# Patient Record
Sex: FEMALE | Race: WHITE | NOT HISPANIC OR LATINO | URBAN - METROPOLITAN AREA
[De-identification: names, ages, dates, MRNs, and addresses within clinical notes are randomized per-mention and may not be internally consistent; named-entity substitution may affect disease eponyms.]

---

## 2017-11-15 ENCOUNTER — OUTPATIENT (OUTPATIENT)
Dept: OUTPATIENT SERVICES | Facility: HOSPITAL | Age: 62
LOS: 1 days | Discharge: HOME | End: 2017-11-15

## 2017-11-15 VITALS
OXYGEN SATURATION: 98 % | HEART RATE: 71 BPM | TEMPERATURE: 98 F | SYSTOLIC BLOOD PRESSURE: 103 MMHG | WEIGHT: 248.24 LBS | HEIGHT: 67 IN | DIASTOLIC BLOOD PRESSURE: 68 MMHG | RESPIRATION RATE: 17 BRPM

## 2017-11-15 VITALS — HEIGHT: 67 IN | WEIGHT: 248.24 LBS

## 2017-11-15 DIAGNOSIS — E03.9 HYPOTHYROIDISM, UNSPECIFIED: ICD-10-CM

## 2017-11-15 DIAGNOSIS — M75.121 COMPLETE ROTATOR CUFF TEAR OR RUPTURE OF RIGHT SHOULDER, NOT SPECIFIED AS TRAUMATIC: ICD-10-CM

## 2017-11-15 DIAGNOSIS — Z98.890 OTHER SPECIFIED POSTPROCEDURAL STATES: Chronic | ICD-10-CM

## 2017-11-15 DIAGNOSIS — Z01.818 ENCOUNTER FOR OTHER PREPROCEDURAL EXAMINATION: ICD-10-CM

## 2017-11-15 DIAGNOSIS — Z90.49 ACQUIRED ABSENCE OF OTHER SPECIFIED PARTS OF DIGESTIVE TRACT: Chronic | ICD-10-CM

## 2017-11-15 DIAGNOSIS — I10 ESSENTIAL (PRIMARY) HYPERTENSION: ICD-10-CM

## 2017-11-15 DIAGNOSIS — I82.592 CHRONIC EMBOLISM AND THROMBOSIS OF OTHER SPECIFIED DEEP VEIN OF LEFT LOWER EXTREMITY: ICD-10-CM

## 2017-11-15 LAB
ANION GAP SERPL CALC-SCNC: 9 MMOL/L — SIGNIFICANT CHANGE UP (ref 5–17)
APTT BLD: 30.1 SEC — SIGNIFICANT CHANGE UP (ref 27.5–37.4)
BASOPHILS # BLD AUTO: 0.1 K/UL — SIGNIFICANT CHANGE UP (ref 0–0.2)
BASOPHILS NFR BLD AUTO: 1.2 % — SIGNIFICANT CHANGE UP (ref 0–2)
BUN SERPL-MCNC: 13 MG/DL — SIGNIFICANT CHANGE UP (ref 7–23)
CALCIUM SERPL-MCNC: 8.6 MG/DL — SIGNIFICANT CHANGE UP (ref 8.5–10.1)
CHLORIDE SERPL-SCNC: 108 MMOL/L — SIGNIFICANT CHANGE UP (ref 96–108)
CO2 SERPL-SCNC: 25 MMOL/L — SIGNIFICANT CHANGE UP (ref 22–31)
CREAT SERPL-MCNC: 0.65 MG/DL — SIGNIFICANT CHANGE UP (ref 0.5–1.3)
EOSINOPHIL # BLD AUTO: 0.4 K/UL — SIGNIFICANT CHANGE UP (ref 0–0.5)
EOSINOPHIL NFR BLD AUTO: 5.8 % — SIGNIFICANT CHANGE UP (ref 0–6)
GLUCOSE SERPL-MCNC: 84 MG/DL — SIGNIFICANT CHANGE UP (ref 70–99)
HCT VFR BLD CALC: 40.8 % — SIGNIFICANT CHANGE UP (ref 34.5–45)
HGB BLD-MCNC: 13.6 G/DL — SIGNIFICANT CHANGE UP (ref 11.5–15.5)
INR BLD: 1.04 RATIO — SIGNIFICANT CHANGE UP (ref 0.88–1.16)
LYMPHOCYTES # BLD AUTO: 2.7 K/UL — SIGNIFICANT CHANGE UP (ref 1–3.3)
LYMPHOCYTES # BLD AUTO: 42.6 % — SIGNIFICANT CHANGE UP (ref 13–44)
MCHC RBC-ENTMCNC: 30.2 PG — SIGNIFICANT CHANGE UP (ref 27–34)
MCHC RBC-ENTMCNC: 33.2 GM/DL — SIGNIFICANT CHANGE UP (ref 32–36)
MCV RBC AUTO: 91.1 FL — SIGNIFICANT CHANGE UP (ref 80–100)
MONOCYTES # BLD AUTO: 0.4 K/UL — SIGNIFICANT CHANGE UP (ref 0–0.9)
MONOCYTES NFR BLD AUTO: 7 % — SIGNIFICANT CHANGE UP (ref 2–14)
NEUTROPHILS # BLD AUTO: 2.7 K/UL — SIGNIFICANT CHANGE UP (ref 1.8–7.4)
NEUTROPHILS NFR BLD AUTO: 43.4 % — SIGNIFICANT CHANGE UP (ref 43–77)
PLATELET # BLD AUTO: 253 K/UL — SIGNIFICANT CHANGE UP (ref 150–400)
POTASSIUM SERPL-MCNC: 4.1 MMOL/L — SIGNIFICANT CHANGE UP (ref 3.5–5.3)
POTASSIUM SERPL-SCNC: 4.1 MMOL/L — SIGNIFICANT CHANGE UP (ref 3.5–5.3)
PROTHROM AB SERPL-ACNC: 11.3 SEC — SIGNIFICANT CHANGE UP (ref 9.8–12.7)
RBC # BLD: 4.49 M/UL — SIGNIFICANT CHANGE UP (ref 3.8–5.2)
RBC # FLD: 11.3 % — SIGNIFICANT CHANGE UP (ref 11–15)
SODIUM SERPL-SCNC: 142 MMOL/L — SIGNIFICANT CHANGE UP (ref 135–145)
WBC # BLD: 6.3 K/UL — SIGNIFICANT CHANGE UP (ref 3.8–10.5)
WBC # FLD AUTO: 6.3 K/UL — SIGNIFICANT CHANGE UP (ref 3.8–10.5)

## 2017-11-15 RX ORDER — RIVAROXABAN 15 MG-20MG
1 KIT ORAL
Qty: 0 | Refills: 0 | COMMUNITY
End: 2017-11-08

## 2017-11-15 NOTE — H&P PST ADULT - FAMILY HISTORY
Father  Still living? No  Family history of heart disease, Age at diagnosis: Age Unknown     Mother  Still living? Unknown  Family history of gastric ulcer, Age at diagnosis: Age Unknown  Family history of hypertension, Age at diagnosis: Age Unknown

## 2017-11-15 NOTE — H&P PST ADULT - HISTORY OF PRESENT ILLNESS
Patient reports "I tore my right rotator cuff".  Noticed pain about 8 months ago which prompter her to see MD.  Tried one shot of cortisone back in February which gave her pain relief; pain returned and she states the MD told her it was too early for another shot she needs an MRI.  MRI confirmed Right rotator cuff tear.  Reports numbness and tingling down down the right arm.  Take Motrin occasionally for pain along with warm compress and ice packs.

## 2017-11-15 NOTE — H&P PST ADULT - NSANTHOSAYNRD_GEN_A_CORE
No. RYAN screening performed.  STOP BANG Legend: 0-2 = LOW Risk; 3-4 = INTERMEDIATE Risk; 5-8 = HIGH Risk

## 2017-11-15 NOTE — ASU PATIENT PROFILE, ADULT - PMH
Acquired hypothyroidism    Chronic deep vein thrombosis (DVT) of other vein of left lower extremity    Essential hypertension

## 2017-11-15 NOTE — H&P PST ADULT - PMH
Acquired hypothyroidism    Chronic deep vein thrombosis (DVT) of other vein of left lower extremity    Essential hypertension    Obese

## 2017-11-15 NOTE — H&P PST ADULT - PROBLEM SELECTOR PLAN 1
continue current management  report s/s of recurrent DVT to Vascular MD  ambulate as tolerated  smoking cessation

## 2017-11-27 ENCOUNTER — TRANSCRIPTION ENCOUNTER (OUTPATIENT)
Age: 62
End: 2017-11-27

## 2017-11-27 ENCOUNTER — OUTPATIENT (OUTPATIENT)
Dept: OUTPATIENT SERVICES | Facility: HOSPITAL | Age: 62
LOS: 1 days | Discharge: HOME | End: 2017-11-27

## 2017-11-27 VITALS
RESPIRATION RATE: 17 BRPM | OXYGEN SATURATION: 99 % | TEMPERATURE: 98 F | HEART RATE: 65 BPM | SYSTOLIC BLOOD PRESSURE: 107 MMHG | DIASTOLIC BLOOD PRESSURE: 51 MMHG

## 2017-11-27 VITALS
SYSTOLIC BLOOD PRESSURE: 109 MMHG | OXYGEN SATURATION: 98 % | WEIGHT: 240.08 LBS | HEART RATE: 68 BPM | DIASTOLIC BLOOD PRESSURE: 54 MMHG | TEMPERATURE: 97 F | RESPIRATION RATE: 22 BRPM | HEIGHT: 68 IN

## 2017-11-27 DIAGNOSIS — Z98.890 OTHER SPECIFIED POSTPROCEDURAL STATES: Chronic | ICD-10-CM

## 2017-11-27 DIAGNOSIS — Z90.49 ACQUIRED ABSENCE OF OTHER SPECIFIED PARTS OF DIGESTIVE TRACT: Chronic | ICD-10-CM

## 2017-11-27 RX ORDER — IBUPROFEN 200 MG
1 TABLET ORAL
Qty: 0 | Refills: 0 | COMMUNITY

## 2017-11-27 RX ORDER — SODIUM CHLORIDE 9 MG/ML
3 INJECTION INTRAMUSCULAR; INTRAVENOUS; SUBCUTANEOUS EVERY 8 HOURS
Qty: 0 | Refills: 0 | Status: DISCONTINUED | OUTPATIENT
Start: 2017-11-27 | End: 2017-11-27

## 2017-11-27 RX ORDER — DOCUSATE SODIUM 100 MG
1 CAPSULE ORAL
Qty: 30 | Refills: 0
Start: 2017-11-27

## 2017-11-27 RX ORDER — SODIUM CHLORIDE 9 MG/ML
1000 INJECTION, SOLUTION INTRAVENOUS
Qty: 0 | Refills: 0 | Status: DISCONTINUED | OUTPATIENT
Start: 2017-11-27 | End: 2017-12-12

## 2017-11-27 RX ORDER — OXYCODONE HYDROCHLORIDE 5 MG/1
1 TABLET ORAL
Qty: 60 | Refills: 0
Start: 2017-11-27

## 2017-11-27 RX ADMIN — SODIUM CHLORIDE 75 MILLILITER(S): 9 INJECTION, SOLUTION INTRAVENOUS at 14:57

## 2017-11-27 RX ADMIN — SODIUM CHLORIDE 3 MILLILITER(S): 9 INJECTION INTRAMUSCULAR; INTRAVENOUS; SUBCUTANEOUS at 10:26

## 2017-11-27 NOTE — BRIEF OPERATIVE NOTE - PROCEDURE
<<-----Click on this checkbox to enter Procedure Rotator cuff repair  11/27/2017  S/P right shoulder arthropscopic rotator cuff repair, biceps tenotomy, subacromial decmpression  Active  RICHARDICHEL

## 2017-11-27 NOTE — ASU DISCHARGE PLAN (ADULT/PEDIATRIC). - MEDICATION SUMMARY - MEDICATIONS TO TAKE
I will START or STAY ON the medications listed below when I get home from the hospital:    Edarbi  -- 20 milligram(s) by mouth once a day  -- Indication: For home med    Lasix  -- 10 milligram(s) by mouth once a day  -- Indication: For home med    potassium chloride 20 mEq oral tablet, extended release  -- orally once a day  -- Indication: For home med    Synthroid 137 mcg (0.137 mg) oral tablet  -- 1 tab(s) by mouth once a day  -- Indication: For home med    Calcium 600+D oral tablet  -- 1 tab(s) by mouth once a day  -- Indication: For home med I will START or STAY ON the medications listed below when I get home from the hospital:    oxyCODONE 5 mg oral capsule  -- 1 cap(s) by mouth every 4-6 hours, As Needed -for severe pain MDD:6  -- Caution federal law prohibits the transfer of this drug to any person other  than the person for whom it was prescribed.  It is very important that you take or use this exactly as directed.  Do not skip doses or discontinue unless directed by your doctor.  May cause drowsiness.  Alcohol may intensify this effect.  Use care when operating dangerous machinery.  This prescription cannot be refilled.  Using more of this medication than prescribed may cause serious breathing problems.    -- Indication: For RIGHT SHOULDER ARTHROSCOPY ACROMIOPLASTY SUBACROMIAL DECOMPR    Edarbi  -- 20 milligram(s) by mouth once a day  -- Indication: For home med    Lasix  -- 10 milligram(s) by mouth once a day  -- Indication: For home med    Colace 100 mg oral capsule  -- 1 cap(s) by mouth 2 times a day MDD:2  -- Medication should be taken with plenty of water.    -- Indication: For constipation     potassium chloride 20 mEq oral tablet, extended release  -- orally once a day  -- Indication: For home med    Synthroid 137 mcg (0.137 mg) oral tablet  -- 1 tab(s) by mouth once a day  -- Indication: For home med    Calcium 600+D oral tablet  -- 1 tab(s) by mouth once a day  -- Indication: For home med

## 2017-11-27 NOTE — BRIEF OPERATIVE NOTE - OPERATION/FINDINGS
S/P right shoulder arthropscopic rotator cuff repair, biceps tenotomy, subacromial decmpression    See op reprt

## 2017-11-27 NOTE — ASU PATIENT PROFILE, ADULT - VISION (WITH CORRECTIVE LENSES IF THE PATIENT USUALLY WEARS THEM):
Wear Glasses/Normal vision: sees adequately in most situations; can see medication labels, newsprint

## 2017-11-29 DIAGNOSIS — Z72.0 TOBACCO USE: ICD-10-CM

## 2017-11-29 DIAGNOSIS — Z79.1 LONG TERM (CURRENT) USE OF NON-STEROIDAL ANTI-INFLAMMATORIES (NSAID): ICD-10-CM

## 2017-11-29 DIAGNOSIS — M75.121 COMPLETE ROTATOR CUFF TEAR OR RUPTURE OF RIGHT SHOULDER, NOT SPECIFIED AS TRAUMATIC: ICD-10-CM

## 2017-11-29 DIAGNOSIS — Z88.0 ALLERGY STATUS TO PENICILLIN: ICD-10-CM

## 2017-11-29 DIAGNOSIS — Z91.040 LATEX ALLERGY STATUS: ICD-10-CM

## 2017-11-29 DIAGNOSIS — M66.821 SPONTANEOUS RUPTURE OF OTHER TENDONS, RIGHT UPPER ARM: ICD-10-CM

## 2017-11-29 DIAGNOSIS — E03.9 HYPOTHYROIDISM, UNSPECIFIED: ICD-10-CM

## 2017-11-29 DIAGNOSIS — I10 ESSENTIAL (PRIMARY) HYPERTENSION: ICD-10-CM

## 2017-11-29 DIAGNOSIS — M25.511 PAIN IN RIGHT SHOULDER: ICD-10-CM

## 2019-07-10 PROBLEM — E66.9 OBESITY, UNSPECIFIED: Chronic | Status: ACTIVE | Noted: 2017-11-15

## 2019-07-10 PROBLEM — E03.9 HYPOTHYROIDISM, UNSPECIFIED: Chronic | Status: ACTIVE | Noted: 2017-11-15

## 2019-07-10 PROBLEM — I82.592 CHRONIC EMBOLISM AND THROMBOSIS OF OTHER SPECIFIED DEEP VEIN OF LEFT LOWER EXTREMITY: Chronic | Status: ACTIVE | Noted: 2017-11-15

## 2019-07-16 ENCOUNTER — OUTPATIENT (OUTPATIENT)
Dept: OUTPATIENT SERVICES | Facility: HOSPITAL | Age: 64
LOS: 1 days | Discharge: HOME | End: 2019-07-16
Payer: COMMERCIAL

## 2019-07-16 VITALS
HEART RATE: 76 BPM | RESPIRATION RATE: 18 BRPM | WEIGHT: 231.71 LBS | TEMPERATURE: 98 F | OXYGEN SATURATION: 97 % | DIASTOLIC BLOOD PRESSURE: 73 MMHG | HEIGHT: 67 IN | SYSTOLIC BLOOD PRESSURE: 130 MMHG

## 2019-07-16 DIAGNOSIS — M75.122 COMPLETE ROTATOR CUFF TEAR OR RUPTURE OF LEFT SHOULDER, NOT SPECIFIED AS TRAUMATIC: ICD-10-CM

## 2019-07-16 DIAGNOSIS — Z98.890 OTHER SPECIFIED POSTPROCEDURAL STATES: Chronic | ICD-10-CM

## 2019-07-16 DIAGNOSIS — E03.9 HYPOTHYROIDISM, UNSPECIFIED: ICD-10-CM

## 2019-07-16 DIAGNOSIS — Z01.812 ENCOUNTER FOR PREPROCEDURAL LABORATORY EXAMINATION: ICD-10-CM

## 2019-07-16 DIAGNOSIS — Z90.49 ACQUIRED ABSENCE OF OTHER SPECIFIED PARTS OF DIGESTIVE TRACT: Chronic | ICD-10-CM

## 2019-07-16 DIAGNOSIS — Z01.818 ENCOUNTER FOR OTHER PREPROCEDURAL EXAMINATION: ICD-10-CM

## 2019-07-16 DIAGNOSIS — I82.819 EMBOLISM AND THROMBOSIS OF SUPERFICIAL VEINS OF UNSPECIFIED LOWER EXTREMITY: ICD-10-CM

## 2019-07-16 LAB
ANION GAP SERPL CALC-SCNC: 7 MMOL/L — SIGNIFICANT CHANGE UP (ref 5–17)
BASOPHILS # BLD AUTO: 0.04 K/UL — SIGNIFICANT CHANGE UP (ref 0–0.2)
BASOPHILS NFR BLD AUTO: 0.6 % — SIGNIFICANT CHANGE UP (ref 0–2)
BUN SERPL-MCNC: 10 MG/DL — SIGNIFICANT CHANGE UP (ref 7–23)
CALCIUM SERPL-MCNC: 9 MG/DL — SIGNIFICANT CHANGE UP (ref 8.5–10.1)
CHLORIDE SERPL-SCNC: 106 MMOL/L — SIGNIFICANT CHANGE UP (ref 96–108)
CO2 SERPL-SCNC: 27 MMOL/L — SIGNIFICANT CHANGE UP (ref 22–31)
CREAT SERPL-MCNC: 0.7 MG/DL — SIGNIFICANT CHANGE UP (ref 0.5–1.3)
EOSINOPHIL # BLD AUTO: 0.21 K/UL — SIGNIFICANT CHANGE UP (ref 0–0.5)
EOSINOPHIL NFR BLD AUTO: 3 % — SIGNIFICANT CHANGE UP (ref 0–6)
GLUCOSE SERPL-MCNC: 86 MG/DL — SIGNIFICANT CHANGE UP (ref 70–99)
HCT VFR BLD CALC: 40.2 % — SIGNIFICANT CHANGE UP (ref 34.5–45)
HGB BLD-MCNC: 13.4 G/DL — SIGNIFICANT CHANGE UP (ref 11.5–15.5)
IMM GRANULOCYTES NFR BLD AUTO: 0.1 % — SIGNIFICANT CHANGE UP (ref 0–1.5)
LYMPHOCYTES # BLD AUTO: 2.7 K/UL — SIGNIFICANT CHANGE UP (ref 1–3.3)
LYMPHOCYTES # BLD AUTO: 38.2 % — SIGNIFICANT CHANGE UP (ref 13–44)
MCHC RBC-ENTMCNC: 29.7 PG — SIGNIFICANT CHANGE UP (ref 27–34)
MCHC RBC-ENTMCNC: 33.3 GM/DL — SIGNIFICANT CHANGE UP (ref 32–36)
MCV RBC AUTO: 89.1 FL — SIGNIFICANT CHANGE UP (ref 80–100)
MONOCYTES # BLD AUTO: 0.47 K/UL — SIGNIFICANT CHANGE UP (ref 0–0.9)
MONOCYTES NFR BLD AUTO: 6.7 % — SIGNIFICANT CHANGE UP (ref 2–14)
NEUTROPHILS # BLD AUTO: 3.63 K/UL — SIGNIFICANT CHANGE UP (ref 1.8–7.4)
NEUTROPHILS NFR BLD AUTO: 51.4 % — SIGNIFICANT CHANGE UP (ref 43–77)
NRBC # BLD: 0 /100 WBCS — SIGNIFICANT CHANGE UP (ref 0–0)
PLATELET # BLD AUTO: 299 K/UL — SIGNIFICANT CHANGE UP (ref 150–400)
POTASSIUM SERPL-MCNC: 4 MMOL/L — SIGNIFICANT CHANGE UP (ref 3.5–5.3)
POTASSIUM SERPL-SCNC: 4 MMOL/L — SIGNIFICANT CHANGE UP (ref 3.5–5.3)
RBC # BLD: 4.51 M/UL — SIGNIFICANT CHANGE UP (ref 3.8–5.2)
RBC # FLD: 13.3 % — SIGNIFICANT CHANGE UP (ref 10.3–14.5)
SODIUM SERPL-SCNC: 140 MMOL/L — SIGNIFICANT CHANGE UP (ref 135–145)
WBC # BLD: 7.06 K/UL — SIGNIFICANT CHANGE UP (ref 3.8–10.5)
WBC # FLD AUTO: 7.06 K/UL — SIGNIFICANT CHANGE UP (ref 3.8–10.5)

## 2019-07-16 PROCEDURE — 93010 ELECTROCARDIOGRAM REPORT: CPT

## 2019-07-16 RX ORDER — AZILSARTAN KAMEDOXOMIL 40 MG/1
20 TABLET ORAL
Qty: 0 | Refills: 0 | DISCHARGE

## 2019-07-16 NOTE — H&P PST ADULT - NSICDXPASTMEDICALHX_GEN_ALL_CORE_FT
PAST MEDICAL HISTORY:  Acquired hypothyroidism     Chronic deep vein thrombosis (DVT) of other vein of left lower extremity     Essential hypertension     Obese

## 2019-07-16 NOTE — H&P PST ADULT - NSICDXPROBLEM_GEN_ALL_CORE_FT
PROBLEM DIAGNOSES  Problem: Complete rotator cuff tear or rupture of left shoulder, not specified as traumatic  Assessment and Plan: Left shoulder arthroscopy  Pre-op instructions given, patient verbalized understanding  Chlorhexidine wash instructions given   Pending: medical clearance  Pending: vascular clearance    Problem: Hypothyroid  Assessment and Plan: Continue current regimen and medications.     Problem: Venous embolism and thrombosis of superficial vessels of lower extremity, unspecified laterality  Assessment and Plan: Being followed by vascular  Pending: Vascular clearance

## 2019-07-16 NOTE — H&P PST ADULT - HISTORY OF PRESENT ILLNESS
63F pmh superficial venous thrombosis, hypothyroid c/o left shoulder pain found to have complete rotator cuff tear here for PST for scheduled left shoulder arthroscopy

## 2019-07-16 NOTE — H&P PST ADULT - ASSESSMENT
63F pmh superficial venous thrombosis, hypothyroid c/o left shoulder pain found to have complete rotator cuff tear here for PST for scheduled left shoulder arthroscopy  CAPRINI SCORE    AGE RELATED RISK FACTORS                                                       MOBILITY RELATED FACTORS  [ ] Age 41-60 years                                            (1 Point)                  [ ] Bed rest                                                        (1 Point)  [x] Age: 61-74 years                                           (2 Points)                [ ] Plaster cast                                                   (2 Points)  [ ] Age= 75 years                                              (3 Points)                 [ ] Bed bound for more than 72 hours                   (2 Points)    DISEASE RELATED RISK FACTORS                                               GENDER SPECIFIC FACTORS  [x ] Edema in the lower extremities                       (1 Point)                  [ ] Pregnancy                                                     (1 Point)  [ ] Varicose veins                                               (1 Point)                  [ ] Post-partum < 6 weeks                                   (1 Point)             [x ] BMI > 25 Kg/m2                                            (1 Point)                  [ ] Hormonal therapy  or oral contraception            (1 Point)                 [ ] Sepsis (in the previous month)                        (1 Point)                  [ ] History of pregnancy complications  [ ] Pneumonia or serious lung disease                                               [ ] Unexplained or recurrent                       (1 Point)           (in the previous month)                               (1 Point)  [ ] Abnormal pulmonary function test                     (1 Point)                 SURGERY RELATED RISK FACTORS  [ ] Acute myocardial infarction                              (1 Point)                 [ ]  Section                                            (1 Point)  [ ] Congestive heart failure (in the previous month)  (1 Point)                 [ ] Minor surgery                                                 (1 Point)   [ ] Inflammatory bowel disease                             (1 Point)                 [x ] Arthroscopic surgery                                        (2 Points)  [ ] Central venous access                                    (2 Points)                [ ] General surgery lasting more than 45 minutes   (2 Points)       [ ] Stroke (in the previous month)                          (5 Points)               [ ] Elective arthroplasty                                        (5 Points)                                                                                                                                               HEMATOLOGY RELATED FACTORS                                                 TRAUMA RELATED RISK FACTORS  [x ] Prior episodes of VTE                                     (3 Points)                 [ ] Fracture of the hip, pelvis, or leg                       (5 Points)  [ ] Positive family history for VTE                         (3 Points)                 [ ] Acute spinal cord injury (in the previous month)  (5 Points)  [ ] Prothrombin 77308 A                                      (3 Points)                 [ ] Paralysis  (less than 1 month)                          (5 Points)  [ ] Factor V Leiden                                             (3 Points)                 [ ] Multiple Trauma within 1 month                         (5 Points)  [ ] Lupus anticoagulants                                     (3 Points)                                                           [ ] Anticardiolipin antibodies                                (3 Points)                                                       [ ] High homocysteine in the blood                      (3 Points)                                             [ ] Other congenital or acquired thrombophilia       (3 Points)                                                [ ] Heparin induced thrombocytopenia                  (3 Points)                                          Total Score [     9     ]

## 2019-07-16 NOTE — H&P PST ADULT - NSICDXPASTSURGICALHX_GEN_ALL_CORE_FT
PAST SURGICAL HISTORY:  H/O arthroscopy right shoulder    H/O vein stripping     History of cholecystectomy     S/P cervical disc replacement 2013

## 2019-07-16 NOTE — H&P PST ADULT - NSICDXFAMILYHX_GEN_ALL_CORE_FT
FAMILY HISTORY:  Father  Still living? No  Family history of heart disease, Age at diagnosis: Age Unknown    Mother  Still living? Unknown  Family history of gastric ulcer, Age at diagnosis: Age Unknown  Family history of hypertension, Age at diagnosis: Age Unknown

## 2019-07-21 ENCOUNTER — TRANSCRIPTION ENCOUNTER (OUTPATIENT)
Age: 64
End: 2019-07-21

## 2019-07-22 ENCOUNTER — OUTPATIENT (OUTPATIENT)
Dept: OUTPATIENT SERVICES | Facility: HOSPITAL | Age: 64
LOS: 1 days | Discharge: HOME | End: 2019-07-22

## 2019-07-22 VITALS
RESPIRATION RATE: 18 BRPM | HEART RATE: 80 BPM | HEIGHT: 67 IN | DIASTOLIC BLOOD PRESSURE: 61 MMHG | SYSTOLIC BLOOD PRESSURE: 113 MMHG | WEIGHT: 231.71 LBS | TEMPERATURE: 97 F | OXYGEN SATURATION: 98 %

## 2019-07-22 VITALS
OXYGEN SATURATION: 98 % | HEART RATE: 73 BPM | RESPIRATION RATE: 17 BRPM | DIASTOLIC BLOOD PRESSURE: 75 MMHG | SYSTOLIC BLOOD PRESSURE: 139 MMHG

## 2019-07-22 DIAGNOSIS — Z90.49 ACQUIRED ABSENCE OF OTHER SPECIFIED PARTS OF DIGESTIVE TRACT: Chronic | ICD-10-CM

## 2019-07-22 DIAGNOSIS — Z98.890 OTHER SPECIFIED POSTPROCEDURAL STATES: Chronic | ICD-10-CM

## 2019-07-22 RX ORDER — SODIUM CHLORIDE 9 MG/ML
1000 INJECTION, SOLUTION INTRAVENOUS
Refills: 0 | Status: DISCONTINUED | OUTPATIENT
Start: 2019-07-22 | End: 2019-07-22

## 2019-07-22 RX ORDER — ACETAMINOPHEN 500 MG
1000 TABLET ORAL ONCE
Refills: 0 | Status: COMPLETED | OUTPATIENT
Start: 2019-07-22 | End: 2019-07-22

## 2019-07-22 RX ADMIN — SODIUM CHLORIDE 75 MILLILITER(S): 9 INJECTION, SOLUTION INTRAVENOUS at 11:12

## 2019-07-22 RX ADMIN — Medication 400 MILLIGRAM(S): at 11:37

## 2019-07-22 NOTE — ASU PATIENT PROFILE, ADULT - PSH
H/O arthroscopy  right shoulder  H/O vein stripping  25 years ago  History of cholecystectomy    S/P cervical disc replacement  2013  S/P rotator cuff repair  right shoulder Nov 2017

## 2019-07-22 NOTE — ASU DISCHARGE PLAN (ADULT/PEDIATRIC) - PROCEDURE
S/P Left shoulder arthroscopic biceps tenotomy, rotator cuff debridement, subacromial decmpression S/P Left shoulder arthroscopic biceps tenotomy, rotator cuff debridement, subacromial deco```````````````````mpression

## 2019-07-22 NOTE — BRIEF OPERATIVE NOTE - OPERATION/FINDINGS
see op report    S/P Left shoulder arthroscopic glenohumeral debridement, biceps tenotomy, rotator cuff debridement, subacromial decompression

## 2019-07-24 DIAGNOSIS — M66.822 SPONTANEOUS RUPTURE OF OTHER TENDONS, LEFT UPPER ARM: ICD-10-CM

## 2019-07-24 DIAGNOSIS — M75.122 COMPLETE ROTATOR CUFF TEAR OR RUPTURE OF LEFT SHOULDER, NOT SPECIFIED AS TRAUMATIC: ICD-10-CM

## 2019-07-24 DIAGNOSIS — M24.112 OTHER ARTICULAR CARTILAGE DISORDERS, LEFT SHOULDER: ICD-10-CM

## 2019-07-24 DIAGNOSIS — E03.9 HYPOTHYROIDISM, UNSPECIFIED: ICD-10-CM

## 2019-07-24 DIAGNOSIS — M19.012 PRIMARY OSTEOARTHRITIS, LEFT SHOULDER: ICD-10-CM

## 2019-07-24 DIAGNOSIS — Z72.0 TOBACCO USE: ICD-10-CM

## 2019-07-24 DIAGNOSIS — Z88.0 ALLERGY STATUS TO PENICILLIN: ICD-10-CM

## 2019-07-24 DIAGNOSIS — Z86.718 PERSONAL HISTORY OF OTHER VENOUS THROMBOSIS AND EMBOLISM: ICD-10-CM

## 2019-07-24 DIAGNOSIS — M75.52 BURSITIS OF LEFT SHOULDER: ICD-10-CM

## 2019-07-24 DIAGNOSIS — Z98.1 ARTHRODESIS STATUS: ICD-10-CM

## 2019-07-24 DIAGNOSIS — I10 ESSENTIAL (PRIMARY) HYPERTENSION: ICD-10-CM

## 2019-07-24 DIAGNOSIS — Z91.040 LATEX ALLERGY STATUS: ICD-10-CM

## 2020-01-15 NOTE — H&P PST ADULT - VENOUS THROMBOEMBOLISM SCORE
Preoperative instructions reviewed with patient. Patient given  2 bottles of CHG soap. Instructions reviewed on use of CHG soap. Patient given SSI infection FAQS sheet, as well as a  MRSA/MSSA treatment instruction sheet  With an explanation to patient that they will be notified if treatment instructions need to be initiated. Patient was given the opportunity to ask questions on the information provided. 8

## 2020-07-20 ENCOUNTER — OUTPATIENT (OUTPATIENT)
Dept: OUTPATIENT SERVICES | Facility: HOSPITAL | Age: 65
LOS: 1 days | End: 2020-07-20
Payer: COMMERCIAL

## 2020-07-20 VITALS
HEIGHT: 66 IN | WEIGHT: 22.49 LBS | SYSTOLIC BLOOD PRESSURE: 138 MMHG | TEMPERATURE: 100 F | OXYGEN SATURATION: 97 % | RESPIRATION RATE: 18 BRPM | DIASTOLIC BLOOD PRESSURE: 70 MMHG | HEART RATE: 67 BPM

## 2020-07-20 DIAGNOSIS — Z90.49 ACQUIRED ABSENCE OF OTHER SPECIFIED PARTS OF DIGESTIVE TRACT: Chronic | ICD-10-CM

## 2020-07-20 DIAGNOSIS — Z98.890 OTHER SPECIFIED POSTPROCEDURAL STATES: Chronic | ICD-10-CM

## 2020-07-20 DIAGNOSIS — M24.574 CONTRACTURE, RIGHT FOOT: ICD-10-CM

## 2020-07-20 DIAGNOSIS — E03.9 HYPOTHYROIDISM, UNSPECIFIED: ICD-10-CM

## 2020-07-20 DIAGNOSIS — M20.5X1 OTHER DEFORMITIES OF TOE(S) (ACQUIRED), RIGHT FOOT: ICD-10-CM

## 2020-07-20 DIAGNOSIS — I10 ESSENTIAL (PRIMARY) HYPERTENSION: ICD-10-CM

## 2020-07-20 DIAGNOSIS — I87.2 VENOUS INSUFFICIENCY (CHRONIC) (PERIPHERAL): ICD-10-CM

## 2020-07-20 DIAGNOSIS — M20.41 OTHER HAMMER TOE(S) (ACQUIRED), RIGHT FOOT: ICD-10-CM

## 2020-07-20 DIAGNOSIS — Z91.040 LATEX ALLERGY STATUS: ICD-10-CM

## 2020-07-20 DIAGNOSIS — Z01.818 ENCOUNTER FOR OTHER PREPROCEDURAL EXAMINATION: ICD-10-CM

## 2020-07-20 DIAGNOSIS — F17.200 NICOTINE DEPENDENCE, UNSPECIFIED, UNCOMPLICATED: ICD-10-CM

## 2020-07-20 DIAGNOSIS — M20.11 HALLUX VALGUS (ACQUIRED), RIGHT FOOT: ICD-10-CM

## 2020-07-20 DIAGNOSIS — R60.0 LOCALIZED EDEMA: ICD-10-CM

## 2020-07-20 LAB
ANION GAP SERPL CALC-SCNC: 8 MMOL/L — SIGNIFICANT CHANGE UP (ref 5–17)
BUN SERPL-MCNC: 14 MG/DL — SIGNIFICANT CHANGE UP (ref 7–23)
CALCIUM SERPL-MCNC: 8.7 MG/DL — SIGNIFICANT CHANGE UP (ref 8.4–10.5)
CHLORIDE SERPL-SCNC: 107 MMOL/L — SIGNIFICANT CHANGE UP (ref 96–108)
CO2 SERPL-SCNC: 26 MMOL/L — SIGNIFICANT CHANGE UP (ref 22–31)
CREAT SERPL-MCNC: 0.7 MG/DL — SIGNIFICANT CHANGE UP (ref 0.5–1.3)
GLUCOSE SERPL-MCNC: 93 MG/DL — SIGNIFICANT CHANGE UP (ref 70–99)
HCT VFR BLD CALC: 40.7 % — SIGNIFICANT CHANGE UP (ref 34.5–45)
HGB BLD-MCNC: 13.7 G/DL — SIGNIFICANT CHANGE UP (ref 11.5–15.5)
MCHC RBC-ENTMCNC: 30 PG — SIGNIFICANT CHANGE UP (ref 27–34)
MCHC RBC-ENTMCNC: 33.7 GM/DL — SIGNIFICANT CHANGE UP (ref 32–36)
MCV RBC AUTO: 89.1 FL — SIGNIFICANT CHANGE UP (ref 80–100)
NRBC # BLD: 0 /100 WBCS — SIGNIFICANT CHANGE UP (ref 0–0)
PLATELET # BLD AUTO: 266 K/UL — SIGNIFICANT CHANGE UP (ref 150–400)
POTASSIUM SERPL-MCNC: 4.3 MMOL/L — SIGNIFICANT CHANGE UP (ref 3.5–5.3)
POTASSIUM SERPL-SCNC: 4.3 MMOL/L — SIGNIFICANT CHANGE UP (ref 3.5–5.3)
RBC # BLD: 4.57 M/UL — SIGNIFICANT CHANGE UP (ref 3.8–5.2)
RBC # FLD: 11.9 % — SIGNIFICANT CHANGE UP (ref 10.3–14.5)
SODIUM SERPL-SCNC: 141 MMOL/L — SIGNIFICANT CHANGE UP (ref 135–145)
WBC # BLD: 5.57 K/UL — SIGNIFICANT CHANGE UP (ref 3.8–10.5)
WBC # FLD AUTO: 5.57 K/UL — SIGNIFICANT CHANGE UP (ref 3.8–10.5)

## 2020-07-20 PROCEDURE — 85027 COMPLETE CBC AUTOMATED: CPT

## 2020-07-20 PROCEDURE — 93010 ELECTROCARDIOGRAM REPORT: CPT | Mod: NC

## 2020-07-20 PROCEDURE — 36415 COLL VENOUS BLD VENIPUNCTURE: CPT

## 2020-07-20 PROCEDURE — 80048 BASIC METABOLIC PNL TOTAL CA: CPT

## 2020-07-20 PROCEDURE — G0463: CPT

## 2020-07-20 PROCEDURE — 93005 ELECTROCARDIOGRAM TRACING: CPT

## 2020-07-20 RX ORDER — POTASSIUM CHLORIDE 20 MEQ
0 PACKET (EA) ORAL
Qty: 0 | Refills: 0 | DISCHARGE

## 2020-07-20 RX ORDER — FUROSEMIDE 40 MG
10 TABLET ORAL
Qty: 0 | Refills: 0 | DISCHARGE

## 2020-07-20 NOTE — H&P PST ADULT - NEGATIVE CARDIOVASCULAR SYMPTOMS
no claudication/no paroxysmal nocturnal dyspnea/no palpitations/no dyspnea on exertion/no orthopnea/no chest pain

## 2020-07-20 NOTE — H&P PST ADULT - NSICDXPASTMEDICALHX_GEN_ALL_CORE_FT
PAST MEDICAL HISTORY:  Acquired hypothyroidism     Chronic deep vein thrombosis (DVT) of other vein of left lower extremity     Essential hypertension     Latex allergy status     Obese     Smoker     Venous insufficiency

## 2020-07-20 NOTE — H&P PST ADULT - NEGATIVE ALLERGY TYPES
no outdoor environmental allergies/no indoor environmental allergies/no reactions to food/no reactions to animals

## 2020-07-20 NOTE — H&P PST ADULT - NSICDXPASTSURGICALHX_GEN_ALL_CORE_FT
PAST SURGICAL HISTORY:  H/O arthroscopy right shoulder    H/O vein stripping 25 years ago    History of cholecystectomy     S/P cervical disc replacement 2013    S/P rotator cuff repair right shoulder Nov 2017

## 2020-07-20 NOTE — H&P PST ADULT - HISTORY OF PRESENT ILLNESS
65yo female current everyday smoker with medical h/o Veinous insufficiency, Hypothyroid and Bunion, right foot. Pt presents today for PST for Correction of Hammertoe and Malik Josafat, Right Foot scheduled for 7/31/2020 63yo female current everyday smoker with medical h/o Venous insufficiency, Hypothyroid and Bunion, right foot. Pt presents today for PST for Correction of Hammertoe and Malik Josafat, Right Foot scheduled for 7/31/2020

## 2020-07-20 NOTE — H&P PST ADULT - NSICDXPROBLEM_GEN_ALL_CORE_FT
PROBLEM DIAGNOSES  Problem: Hallux valgus (acquired), right foot  Assessment and Plan: Pre-op instructions given. Pt verbalized udnerstanding  Chlorhexidine wash instructions given  Pendign: M/C & Vascular clearance + Covid19 test to be done 7/28    Problem: Other hammer toe(s) (acquired), right foot  Assessment and Plan: noted     Problem: Hypothyroidism  Assessment and Plan: Pt instrycted to take meds as prescribed     Problem: Hypertension  Assessment and Plan: RYAN precaution - stop bang 3  Pt instructed to take meds as prescribed     Problem: Pedal edema  Assessment and Plan: Pt instructed to take meds as prescribed     Problem: Venous insufficiency  Assessment and Plan: noted     Problem: Latex allergy status  Assessment and Plan: noted    Problem: Smoker  Assessment and Plan: Smoking cessation encouraged PROBLEM DIAGNOSES  Problem: Hallux valgus (acquired), right foot  Assessment and Plan: Pre-op instructions given. Pt verbalized understanding  Chlorhexidine wash instructions given  Pending: M/C & Vascular clearance + Covid19 test to be done 7/28    Problem: Other hammer toe(s) (acquired), right foot  Assessment and Plan: noted     Problem: Hypothyroidism  Assessment and Plan: Pt instructed to take meds as prescribed     Problem: Hypertension  Assessment and Plan: RYAN precaution - stop bang 3  Pt instructed to take meds as prescribed     Problem: Pedal edema  Assessment and Plan: Pt instructed to take meds as prescribed     Problem: Venous insufficiency  Assessment and Plan: noted     Problem: Latex allergy status  Assessment and Plan: noted    Problem: Smoker  Assessment and Plan: Smoking cessation encouraged

## 2020-07-20 NOTE — H&P PST ADULT - NSANTHTIREDRD_ENT_A_CORE
Pt presents to ED with complaints of upper abdominal pain. Pt states she has a history of chrons disease and has an colostomy bag.    No

## 2020-07-20 NOTE — H&P PST ADULT - MUSCULOSKELETAL COMMENTS
h/o arthritis to both knees and right foot bunion and hammertoes right foot bunion and hammertoe right foot bunion and right foot hammertoe

## 2020-07-20 NOTE — H&P PST ADULT - NEGATIVE MUSCULOSKELETAL SYMPTOMS
no joint swelling/no arm pain L/no arm pain R/no myalgia/no muscle weakness/no back pain/no neck pain/no arthralgia/no muscle cramps

## 2020-07-20 NOTE — H&P PST ADULT - MUSCULOSKELETAL
details… detailed exam ROM intact/no joint warmth/no calf tenderness/normal strength/no joint erythema

## 2020-07-20 NOTE — H&P PST ADULT - NSANTHOSAYNRD_GEN_A_CORE
No. RYAN screening performed.  STOP BANG Legend: 0-2 = LOW Risk; 3-4 = INTERMEDIATE Risk; 5-8 = HIGH Risk/neck 14.5inches

## 2020-07-20 NOTE — H&P PST ADULT - VENOUS THROMBOEMBOLISM CURRENT STATUS
(1) swollen legs (current)/(1) other risk factor (includes escalating BMI, pack-years of smoking, diabetes requiring insulin, chemotherapy, female gender and length of surgery)/(1) varicose veins

## 2020-07-28 ENCOUNTER — LABORATORY RESULT (OUTPATIENT)
Age: 65
End: 2020-07-28

## 2020-07-28 ENCOUNTER — APPOINTMENT (OUTPATIENT)
Dept: DISASTER EMERGENCY | Facility: CLINIC | Age: 65
End: 2020-07-28

## 2020-07-28 PROBLEM — Z91.040 LATEX ALLERGY STATUS: Chronic | Status: ACTIVE | Noted: 2020-07-20

## 2020-07-28 PROBLEM — I87.2 VENOUS INSUFFICIENCY (CHRONIC) (PERIPHERAL): Chronic | Status: ACTIVE | Noted: 2020-07-20

## 2020-07-28 PROBLEM — F17.200 NICOTINE DEPENDENCE, UNSPECIFIED, UNCOMPLICATED: Chronic | Status: ACTIVE | Noted: 2020-07-20

## 2020-07-28 PROBLEM — I10 ESSENTIAL (PRIMARY) HYPERTENSION: Chronic | Status: ACTIVE | Noted: 2017-11-15

## 2020-07-30 ENCOUNTER — TRANSCRIPTION ENCOUNTER (OUTPATIENT)
Age: 65
End: 2020-07-30

## 2020-07-31 ENCOUNTER — OUTPATIENT (OUTPATIENT)
Dept: OUTPATIENT SERVICES | Facility: HOSPITAL | Age: 65
LOS: 1 days | End: 2020-07-31
Payer: COMMERCIAL

## 2020-07-31 ENCOUNTER — RESULT REVIEW (OUTPATIENT)
Age: 65
End: 2020-07-31

## 2020-07-31 VITALS
SYSTOLIC BLOOD PRESSURE: 125 MMHG | DIASTOLIC BLOOD PRESSURE: 60 MMHG | TEMPERATURE: 98 F | OXYGEN SATURATION: 99 % | HEART RATE: 62 BPM | RESPIRATION RATE: 15 BRPM

## 2020-07-31 VITALS
DIASTOLIC BLOOD PRESSURE: 60 MMHG | HEART RATE: 58 BPM | TEMPERATURE: 98 F | HEIGHT: 66 IN | OXYGEN SATURATION: 97 % | WEIGHT: 22.49 LBS | RESPIRATION RATE: 16 BRPM | SYSTOLIC BLOOD PRESSURE: 107 MMHG

## 2020-07-31 DIAGNOSIS — Z98.890 OTHER SPECIFIED POSTPROCEDURAL STATES: Chronic | ICD-10-CM

## 2020-07-31 DIAGNOSIS — M20.11 HALLUX VALGUS (ACQUIRED), RIGHT FOOT: ICD-10-CM

## 2020-07-31 DIAGNOSIS — Z90.49 ACQUIRED ABSENCE OF OTHER SPECIFIED PARTS OF DIGESTIVE TRACT: Chronic | ICD-10-CM

## 2020-07-31 DIAGNOSIS — M20.5X1 OTHER DEFORMITIES OF TOE(S) (ACQUIRED), RIGHT FOOT: ICD-10-CM

## 2020-07-31 DIAGNOSIS — M24.574 CONTRACTURE, RIGHT FOOT: ICD-10-CM

## 2020-07-31 DIAGNOSIS — M20.41 OTHER HAMMER TOE(S) (ACQUIRED), RIGHT FOOT: ICD-10-CM

## 2020-07-31 PROCEDURE — C1889: CPT

## 2020-07-31 PROCEDURE — 73620 X-RAY EXAM OF FOOT: CPT

## 2020-07-31 PROCEDURE — 28270 RELEASE OF FOOT CONTRACTURE: CPT | Mod: XS,RT

## 2020-07-31 PROCEDURE — 88311 DECALCIFY TISSUE: CPT

## 2020-07-31 PROCEDURE — 73620 X-RAY EXAM OF FOOT: CPT | Mod: 26,RT

## 2020-07-31 PROCEDURE — C1713: CPT

## 2020-07-31 PROCEDURE — 88304 TISSUE EXAM BY PATHOLOGIST: CPT

## 2020-07-31 PROCEDURE — 88304 TISSUE EXAM BY PATHOLOGIST: CPT | Mod: 26

## 2020-07-31 PROCEDURE — 88311 DECALCIFY TISSUE: CPT | Mod: 26

## 2020-07-31 PROCEDURE — 28299 COR HLX VLGS DOUBLE OSTEOT: CPT | Mod: RT

## 2020-07-31 PROCEDURE — 28285 REPAIR OF HAMMERTOE: CPT | Mod: T8

## 2020-07-31 RX ORDER — SODIUM CHLORIDE 9 MG/ML
1000 INJECTION, SOLUTION INTRAVENOUS
Refills: 0 | Status: DISCONTINUED | OUTPATIENT
Start: 2020-07-31 | End: 2020-07-31

## 2020-07-31 RX ORDER — FUROSEMIDE 40 MG
20 TABLET ORAL
Qty: 0 | Refills: 0 | DISCHARGE

## 2020-07-31 RX ORDER — POTASSIUM CHLORIDE 20 MEQ
1 PACKET (EA) ORAL
Qty: 0 | Refills: 0 | DISCHARGE

## 2020-07-31 RX ORDER — HYDROMORPHONE HYDROCHLORIDE 2 MG/ML
0.5 INJECTION INTRAMUSCULAR; INTRAVENOUS; SUBCUTANEOUS
Refills: 0 | Status: DISCONTINUED | OUTPATIENT
Start: 2020-07-31 | End: 2020-07-31

## 2020-07-31 RX ORDER — L.ACIDOPH/B.ANIMALIS/B.LONGUM 15B CELL
1 CAPSULE ORAL
Qty: 0 | Refills: 0 | DISCHARGE

## 2020-07-31 RX ORDER — MAGNESIUM CHLORIDE
1 CRYSTALS MISCELLANEOUS
Qty: 0 | Refills: 0 | DISCHARGE

## 2020-07-31 RX ORDER — FUROSEMIDE 40 MG
20 TABLET ORAL DAILY
Refills: 0 | Status: DISCONTINUED | OUTPATIENT
Start: 2020-07-31 | End: 2020-07-31

## 2020-07-31 RX ORDER — ONDANSETRON 8 MG/1
4 TABLET, FILM COATED ORAL ONCE
Refills: 0 | Status: DISCONTINUED | OUTPATIENT
Start: 2020-07-31 | End: 2020-07-31

## 2020-07-31 RX ORDER — LEVOTHYROXINE SODIUM 125 MCG
1 TABLET ORAL
Qty: 0 | Refills: 0 | DISCHARGE

## 2020-07-31 RX ORDER — FUROSEMIDE 40 MG
1 TABLET ORAL
Qty: 0 | Refills: 0 | DISCHARGE
Start: 2020-07-31

## 2020-07-31 RX ADMIN — SODIUM CHLORIDE 50 MILLILITER(S): 9 INJECTION, SOLUTION INTRAVENOUS at 07:43

## 2020-07-31 NOTE — BRIEF OPERATIVE NOTE - NSICDXBRIEFPOSTOP_GEN_ALL_CORE_FT
POST-OP DIAGNOSIS:  Hammertoe of right foot 31-Jul-2020 10:36:13  Shira Villa, right foot 31-Jul-2020 10:36:05  Shira Villa

## 2020-07-31 NOTE — ASU DISCHARGE PLAN (ADULT/PEDIATRIC) - CARE PROVIDER_API CALL
Shira Villa  SURGERY  [facility] [place] [address]  Phone: [phone]  Fax: [phone]  Follow Up Time:

## 2020-07-31 NOTE — BRIEF OPERATIVE NOTE - NSICDXBRIEFPROCEDURE_GEN_ALL_CORE_FT
PROCEDURES:  Gerberertoe repair 31-Jul-2020 10:35:37  Shira Villa  Malik-Josafat bunionectomy 31-Jul-2020 10:35:27  Shira Villa

## 2020-07-31 NOTE — ASU DISCHARGE PLAN (ADULT/PEDIATRIC) - CALL YOUR DOCTOR IF YOU HAVE ANY OF THE FOLLOWING:
Bleeding that does not stop/Wound/Surgical Site with redness, or foul smelling discharge or pus/Swelling that gets worse/Nausea and vomiting that does not stop/Pain not relieved by Medications/Excessive diarrhea/Inability to tolerate liquids or foods/Increased irritability or sluggishness/Numbness, tingling, color or temperature change to extremity/Unable to urinate/Fever greater than (need to indicate Fahrenheit or Celsius)

## 2020-07-31 NOTE — BRIEF OPERATIVE NOTE - NSICDXBRIEFPREOP_GEN_ALL_CORE_FT
PRE-OP DIAGNOSIS:  Hammertoe of right foot 31-Jul-2020 10:35:55  Sihra Villa  Bunion of right foot 31-Jul-2020 10:35:47  Shira Villa

## 2020-07-31 NOTE — ASU DISCHARGE PLAN (ADULT/PEDIATRIC) - NURSING INSTRUCTIONS
All discharge instructions reviewed with patient including pain, safety, medication and follow up care.  Instructed on ambulating with cane.

## 2020-07-31 NOTE — ASU PATIENT PROFILE, ADULT - PMH
Acquired hypothyroidism    Chronic deep vein thrombosis (DVT) of other vein of left lower extremity    Essential hypertension    Latex allergy status    Obese    Smoker    Venous insufficiency

## 2020-08-03 LAB — SURGICAL PATHOLOGY STUDY: SIGNIFICANT CHANGE UP

## 2020-08-06 ENCOUNTER — TRANSCRIPTION ENCOUNTER (OUTPATIENT)
Age: 65
End: 2020-08-06

## 2021-06-01 NOTE — H&P PST ADULT - NS HEP C RISK YEAR OPTION
June 1, 2021        Zhane Clarke  [address] [id number]    To Whom It May Concern:    This is to certify Zhane Clarke was evaluated on 06/01/21 and is unable to return to work.    Zhane Clarke should self-isolate.  ?  CDC guidelines for return to work are as follows:  · At least 24 hours have passed since fever resolution without use of fever reducing medication and   · Symptoms have improved and  · At least 10 days have passed since symptoms first appeared  · At least 10 days have passed since the collection date for a positive COVID-19 test.     **The loss of taste and smell may persist for weeks or months after recovery and do not need to delay the end of isolation.     Per CDC recommendations, employers should not require a COVID-19 test result or a healthcare provider’s note for employees who are sick to validate their illness, qualify for sick leave, or to return to work.    The Coronavirus is a rapidly evolving situation and recommendations are changing regularly to prevent spread of the disease and further loss of life.    Thank you for your understanding during these unusual times.     Electronically signed by:     JAKE Gonzalez                 [employer]   [id number] [place] [address]      
Patient Refused

## 2021-10-12 NOTE — ASU PATIENT PROFILE, ADULT - TEACHING/LEARNING DEVELOPMENTAL CONSIDERATIONS
Flap Thinning Complex Repair Preamble Text (Leave Blank If You Do Not Want): An incision was made along the previous flap suture line. Undermining was performed beneath the flap and redundant tissue was removed to restore the normal contour of the skin. none

## 2022-07-15 ENCOUNTER — APPOINTMENT (OUTPATIENT)
Dept: ORTHOPEDIC SURGERY | Facility: CLINIC | Age: 67
End: 2022-07-15

## 2022-07-15 PROCEDURE — 99213 OFFICE O/P EST LOW 20 MIN: CPT | Mod: 25

## 2022-07-15 PROCEDURE — J3490M: CUSTOM

## 2022-07-15 PROCEDURE — 20610 DRAIN/INJ JOINT/BURSA W/O US: CPT

## 2022-07-15 NOTE — IMAGING
[de-identified] : Right Hip/Thigh: Inspection of the hip/thigh is as follows: Inspection shows no swelling. Range of motion of the hip\par is as follows: limited internal rotation and limited external rotation. \par \par Left Hip/Thigh: Inspection of the hip/thigh is as follows: Inspection shows no swelling. Range of motion of the hip is\par as follows: limited internal rotation and limited external rotation. \par \par Right Knee: Inspection of the knee is as follows: no effusion, erythema, ecchymosis, scars or deformities. Palpation\par of the knee is as follows: medial joint line tenderness, lateral joint line tenderness, medial facet of patella\par tenderness, lateral facet of patella tenderness, patellar compression tenderness and crepitus about the\par patella. Knee Range of Motion is as follows: full flexion and extension without pain (0-140). Strength examination of the\par knee is as follows: Quadriceps strength is 5/5 Hamstring strength is 5/5 Ligament Stability and Special\par Test ligamentously stable. Neurological examination of the knee is as follows: light touch is intact throughout. \par \par Left Knee: Inspection of the knee is as follows: no effusion, erythema, ecchymosis, scars or deformities. Palpation of\par the knee is as follows: medial joint line tenderness, lateral joint line tenderness, medial facet of patella\par tenderness, lateral facet of patella tenderness, patellar compression tenderness and crepitus about the\par patella. Knee Range of Motion is as follows: full flexion and extension without pain (0-140). Strength examination of the\par knee is as follows: Quadriceps strength is 5/5 Hamstring strength is 5/5 Ligament Stability and Special\par Test ligamentously stable. Neurological examination of the knee is as follows: light touch is intact throughout.

## 2022-07-15 NOTE — PROCEDURE
[de-identified] : Procedure Name: Large Joint Injection / Aspiration: Depomedrol and Marcaine\par Anesthesia: ethyl chloride sprayed topically.. \par Depomedrol: An injection of Depomedrol 80 mg , 1 cc. \par Marcaine: 5 cc. \par Medication was injected in the right knee. Patient has tried OTC's including aspirin, Ibuprofen, Aleve etc or prescription\par NSAIDS, and/or exercises at home and/ or physical therapy without satisfactory response. After verbal consent using sterile preparation and technique. The risks, benefits, and alternatives to cortisone injection were explained in full to the patient. Risks outlined include but are not limited to infection, sepsis, bleeding, scarring, skin discoloration, temporary  increase in pain, syncopal episode, failure to resolve symptoms, allergic reaction, symptom recurrence, and elevation of blood sugar in diabetics. Patient understood the risks. All questions were answered. After discussion of options, patient requested an injection. Oral informed consent was obtained and sterile prep was done of the injection\par site. Sterile technique was utilized for the procedure including the preparation of the solutions used for the injection. Patient tolerated the procedure well. Advised to ice the injection site this evening. Prep with alcohol locally to site. Sterile technique used. Post Procedure Instructions: Patient was advised to call if redness, pain, or fever occur and\par apply ice for 15 min. out of every hour for the next 12-24 hours as tolerated.

## 2022-07-15 NOTE — ASSESSMENT
[FreeTextEntry1] : PT WITH ADVANCED RIGHT KNEE OA. SHE HAS TRIED MEDROL AND CYCLOBENZAPRINE FOR PAIN RELIEF. PAIN WORSENS WITH STAIRS AND WALKING LONG DISTANCES. PAIN IS AFFECTING ADL AND FUNCTIONAL ACTIVITIES. TREATMENT OPTIONS REVIEWED. DISCUSSED R&B OF TKA. \par \par RIGHT KNEE CSI TODAY. PATIENT TOLERATED INJECTION WELL. WILL AUTH FOR ORTHOVISC.  \par THIS INJECTION IS MEDICALLY NECESSARY DUE TO SEVERE PAIN AND OA.

## 2022-09-02 ENCOUNTER — APPOINTMENT (OUTPATIENT)
Dept: ORTHOPEDIC SURGERY | Facility: CLINIC | Age: 67
End: 2022-09-02

## 2022-09-02 PROCEDURE — 99214 OFFICE O/P EST MOD 30 MIN: CPT | Mod: 25

## 2022-09-02 PROCEDURE — 20610 DRAIN/INJ JOINT/BURSA W/O US: CPT | Mod: RT

## 2022-09-02 NOTE — IMAGING
[de-identified] : Right Hip/Thigh: Inspection of the hip/thigh is as follows: Inspection shows no swelling. Range of motion of the hip\par is as follows: limited internal rotation and limited external rotation. \par \par Left Hip/Thigh: Inspection of the hip/thigh is as follows: Inspection shows no swelling. Range of motion of the hip is\par as follows: limited internal rotation and limited external rotation. \par \par Right Knee: Inspection of the knee is as follows: no effusion, erythema, ecchymosis, scars or deformities. Palpation\par of the knee is as follows: medial joint line tenderness, lateral joint line tenderness, medial facet of patella\par tenderness, lateral facet of patella tenderness, patellar compression tenderness and crepitus about the\par patella. Knee Range of Motion is as follows: full flexion and extension without pain (0-140). Strength examination of the\par knee is as follows: Quadriceps strength is 5/5 Hamstring strength is 5/5 Ligament Stability and Special\par Test ligamentously stable. Neurological examination of the knee is as follows: light touch is intact throughout. \par \par Left Knee: Inspection of the knee is as follows: no effusion, erythema, ecchymosis, scars or deformities. Palpation of\par the knee is as follows: medial joint line tenderness, lateral joint line tenderness, medial facet of patella\par tenderness, lateral facet of patella tenderness, patellar compression tenderness and crepitus about the\par patella. Knee Range of Motion is as follows: full flexion and extension without pain (0-140). Strength examination of the\par knee is as follows: Quadriceps strength is 5/5 Hamstring strength is 5/5 Ligament Stability and Special\par Test ligamentously stable. Neurological examination of the knee is as follows: light touch is intact throughout.

## 2022-09-02 NOTE — PROCEDURE
[de-identified] : Procedure Name: Orthovisc (Large Joint)\par \par Viscosupplementation Injection: X-ray evidence of Osteoarthritis on this or prior visit, Patient has tried OTC's including aspirin, Ibuprofen, Aleve etc or prescription NSAIDS, and/or exercises at home and/ or physical therapy without satisfactory response and Repeat series performed because patient had significant improvement in their pain and functional capacity from prior series which was given more than six months ago. \par \par An injection of Orthovisc 2ml #1 was injected into the right knee(s). The risks, benefits, and alternatives to Viscosupplementation injection were explained in full to the patient. Risks outlined include but are not limited to infection, sepsis, bleeding, scarring, skin discoloration, temporary increase in pain, syncopal episode, failure to resolve symptoms, allergic reaction, and symptom recurrence. Signs and symptoms of infection reviewed and patient advised to call immediately for redness, fevers, and/or chills. Patient understood the risks. All questions were answered. After discussion of options, patient requested Viscosupplementation. Oral informed consent was obtained and sterile prep was done of the injection site. The patient tolerated the procedure well. Ice tonight to the injection site. \par

## 2022-09-02 NOTE — HISTORY OF PRESENT ILLNESS
[Right Leg] : right leg [Gradual] : gradual [Sudden] : sudden [6] : 6 [5] : 5 [Burning] : burning [Shooting] : shooting [Stabbing] : stabbing [Intermittent] : intermittent [Exercising] : exercising [Retired] : Work status: retired [1] : 1 [Orthovisc] : Orthovisc [] : Post Surgical Visit: no [FreeTextEntry1] : right knee  [de-identified] : none  [de-identified] : 7/15/22 [de-identified] : right knee  [de-identified] : orthovisc  [TWNoteComboBox1] : 90%

## 2022-09-09 ENCOUNTER — APPOINTMENT (OUTPATIENT)
Dept: ORTHOPEDIC SURGERY | Facility: CLINIC | Age: 67
End: 2022-09-09

## 2022-09-09 PROCEDURE — 20610 DRAIN/INJ JOINT/BURSA W/O US: CPT

## 2022-09-09 PROCEDURE — 99213 OFFICE O/P EST LOW 20 MIN: CPT | Mod: 25

## 2022-09-09 NOTE — HISTORY OF PRESENT ILLNESS
[2] : 2 [Orthovisc] : Orthovisc [] : no [de-identified] : right knee [de-identified] : orthovisc

## 2022-09-09 NOTE — PROCEDURE
[de-identified] : Procedure Name: Orthovisc (Large Joint)\par \par Viscosupplementation Injection: X-ray evidence of Osteoarthritis on this or prior visit, Patient has tried OTC's including aspirin, Ibuprofen, Aleve etc or prescription NSAIDS, and/or exercises at home and/ or physical therapy without satisfactory response and Repeat series performed because patient had significant improvement in their pain and functional capacity from prior series which was given more than six months ago. \par \par An injection of Orthovisc 2ml #2 was injected into the right knee(s). The risks, benefits, and alternatives to Viscosupplementation injection were explained in full to the patient. Risks outlined include but are not limited to infection, sepsis, bleeding, scarring, skin discoloration, temporary increase in pain, syncopal episode, failure to resolve symptoms, allergic reaction, and symptom recurrence. Signs and symptoms of infection reviewed and patient advised to call immediately for redness, fevers, and/or chills. Patient understood the risks. All questions were answered. After discussion of options, patient requested Viscosupplementation. Oral informed consent was obtained and sterile prep was done of the injection site. The patient tolerated the procedure well. Ice tonight to the injection site. \par

## 2022-09-09 NOTE — ASSESSMENT
[FreeTextEntry1] : PT WITH ADVANCED RIGHT KNEE OA. SHE HAS TRIED MEDROL AND CYCLOBENZAPRINE FOR PAIN RELIEF. PAIN WORSENS WITH STAIRS AND WALKING LONG DISTANCES. PAIN IS AFFECTING ADL AND FUNCTIONAL ACTIVITIES. TREATMENT OPTIONS REVIEWED. DISCUSSED R&B OF TKA. \par \par RT KNEE ORTHOVISC #2 TODAY. PATIENT TOLERATED INJECTION WELL.

## 2022-09-09 NOTE — IMAGING
[de-identified] : Right Hip/Thigh: Inspection of the hip/thigh is as follows: Inspection shows no swelling. Range of motion of the hip\par is as follows: limited internal rotation and limited external rotation. \par \par Left Hip/Thigh: Inspection of the hip/thigh is as follows: Inspection shows no swelling. Range of motion of the hip is\par as follows: limited internal rotation and limited external rotation. \par \par Right Knee: Inspection of the knee is as follows: no effusion, erythema, ecchymosis, scars or deformities. Palpation\par of the knee is as follows: medial joint line tenderness, lateral joint line tenderness, medial facet of patella\par tenderness, lateral facet of patella tenderness, patellar compression tenderness and crepitus about the\par patella. Knee Range of Motion is as follows: full flexion and extension without pain (0-140). Strength examination of the\par knee is as follows: Quadriceps strength is 5/5 Hamstring strength is 5/5 Ligament Stability and Special\par Test ligamentously stable. Neurological examination of the knee is as follows: light touch is intact throughout. \par \par Left Knee: Inspection of the knee is as follows: no effusion, erythema, ecchymosis, scars or deformities. Palpation of\par the knee is as follows: medial joint line tenderness, lateral joint line tenderness, medial facet of patella\par tenderness, lateral facet of patella tenderness, patellar compression tenderness and crepitus about the\par patella. Knee Range of Motion is as follows: full flexion and extension without pain (0-140). Strength examination of the\par knee is as follows: Quadriceps strength is 5/5 Hamstring strength is 5/5 Ligament Stability and Special\par Test ligamentously stable. Neurological examination of the knee is as follows: light touch is intact throughout.

## 2022-09-16 ENCOUNTER — APPOINTMENT (OUTPATIENT)
Dept: ORTHOPEDIC SURGERY | Facility: CLINIC | Age: 67
End: 2022-09-16

## 2022-09-16 PROCEDURE — 20610 DRAIN/INJ JOINT/BURSA W/O US: CPT | Mod: RT

## 2022-09-16 PROCEDURE — 99214 OFFICE O/P EST MOD 30 MIN: CPT | Mod: 25

## 2022-09-16 NOTE — PROCEDURE
[de-identified] : Procedure Name: Orthovisc (Large Joint)\par \par Viscosupplementation Injection: X-ray evidence of Osteoarthritis on this or prior visit, Patient has tried OTC's including aspirin, Ibuprofen, Aleve etc or prescription NSAIDS, and/or exercises at home and/ or physical therapy without satisfactory response and Repeat series performed because patient had significant improvement in their pain and functional capacity from prior series which was given more than six months ago. \par \par An injection of Orthovisc 2ml #3 was injected into the right knee(s). The risks, benefits, and alternatives to Viscosupplementation injection were explained in full to the patient. Risks outlined include but are not limited to infection, sepsis, bleeding, scarring, skin discoloration, temporary increase in pain, syncopal episode, failure to resolve symptoms, allergic reaction, and symptom recurrence. Signs and symptoms of infection reviewed and patient advised to call immediately for redness, fevers, and/or chills. Patient understood the risks. All questions were answered. After discussion of options, patient requested Viscosupplementation. Oral informed consent was obtained and sterile prep was done of the injection site. The patient tolerated the procedure well. Ice tonight to the injection site. \par

## 2022-09-16 NOTE — ASSESSMENT
[FreeTextEntry1] : PT WITH ADVANCED RIGHT KNEE OA. SHE HAS TRIED MEDROL AND CYCLOBENZAPRINE FOR PAIN RELIEF. PAIN WORSENS WITH STAIRS AND WALKING LONG DISTANCES. PAIN IS AFFECTING ADL AND FUNCTIONAL ACTIVITIES. TREATMENT OPTIONS REVIEWED. DISCUSSED R&B OF TKA. \par \par RT KNEE ORTHOVISC #3 TODAY. PATIENT TOLERATED INJECTION WELL.

## 2022-09-16 NOTE — IMAGING
[de-identified] : Right Hip/Thigh: Inspection of the hip/thigh is as follows: Inspection shows no swelling. Range of motion of the hip\par is as follows: limited internal rotation and limited external rotation. \par \par Left Hip/Thigh: Inspection of the hip/thigh is as follows: Inspection shows no swelling. Range of motion of the hip is\par as follows: limited internal rotation and limited external rotation. \par \par Right Knee: Inspection of the knee is as follows: no effusion, erythema, ecchymosis, scars or deformities. Palpation\par of the knee is as follows: medial joint line tenderness, lateral joint line tenderness, medial facet of patella\par tenderness, lateral facet of patella tenderness, patellar compression tenderness and crepitus about the\par patella. Knee Range of Motion is as follows: full flexion and extension without pain (0-140). Strength examination of the\par knee is as follows: Quadriceps strength is 5/5 Hamstring strength is 5/5 Ligament Stability and Special\par Test ligamentously stable. Neurological examination of the knee is as follows: light touch is intact throughout. \par \par Left Knee: Inspection of the knee is as follows: no effusion, erythema, ecchymosis, scars or deformities. Palpation of\par the knee is as follows: medial joint line tenderness, lateral joint line tenderness, medial facet of patella\par tenderness, lateral facet of patella tenderness, patellar compression tenderness and crepitus about the\par patella. Knee Range of Motion is as follows: full flexion and extension without pain (0-140). Strength examination of the\par knee is as follows: Quadriceps strength is 5/5 Hamstring strength is 5/5 Ligament Stability and Special\par Test ligamentously stable. Neurological examination of the knee is as follows: light touch is intact throughout.

## 2022-09-16 NOTE — HISTORY OF PRESENT ILLNESS
[Gradual] : gradual [5] : 5 [4] : 4 [Intermittent] : intermittent [2] : 2 [Orthovisc] : Orthovisc [de-identified] : 09/16/2022 right knee orthovisc injections # 3 [] : no [de-identified] : orthovisc injections  [de-identified] : right knee [de-identified] : orthovisc

## 2022-09-23 ENCOUNTER — APPOINTMENT (OUTPATIENT)
Dept: ORTHOPEDIC SURGERY | Facility: CLINIC | Age: 67
End: 2022-09-23

## 2022-09-23 VITALS — BODY MASS INDEX: 35.16 KG/M2 | WEIGHT: 232 LBS | HEIGHT: 68 IN

## 2022-09-23 PROCEDURE — 20611 DRAIN/INJ JOINT/BURSA W/US: CPT | Mod: RT

## 2022-09-23 PROCEDURE — 99212 OFFICE O/P EST SF 10 MIN: CPT | Mod: 25

## 2022-09-23 NOTE — ASSESSMENT
[FreeTextEntry1] : PT WITH ADVANCED RIGHT KNEE OA. SHE HAS TRIED MEDROL AND CYCLOBENZAPRINE FOR PAIN RELIEF. PAIN WORSENS WITH STAIRS AND WALKING LONG DISTANCES. PAIN IS AFFECTING ADL AND FUNCTIONAL ACTIVITIES. TREATMENT OPTIONS REVIEWED. DISCUSSED R&B OF TKA. \par \par RT KNEE ORTHOVISC #4 TODAY. PATIENT TOLERATED INJECTION WELL.

## 2022-09-23 NOTE — PROCEDURE
[de-identified] : Procedure Name: Orthovisc (Large Joint)\par \par Viscosupplementation Injection: X-ray evidence of Osteoarthritis on this or prior visit, Patient has tried OTC's including aspirin, Ibuprofen, Aleve etc or prescription NSAIDS, and/or exercises at home and/ or physical therapy without satisfactory response and Repeat series performed because patient had significant improvement in their pain and functional capacity from prior series which was given more than six months ago. \par \par An injection of Orthovisc 2ml #4 was injected into the right knee(s). The risks, benefits, and alternatives to Viscosupplementation injection were explained in full to the patient. Risks outlined include but are not limited to infection, sepsis, bleeding, scarring, skin discoloration, temporary increase in pain, syncopal episode, failure to resolve symptoms, allergic reaction, and symptom recurrence. Signs and symptoms of infection reviewed and patient advised to call immediately for redness, fevers, and/or chills. Patient understood the risks. All questions were answered. After discussion of options, patient requested Viscosupplementation. Oral informed consent was obtained and sterile prep was done of the injection site. The patient tolerated the procedure well. Ice tonight to the injection site. \par

## 2022-09-23 NOTE — HISTORY OF PRESENT ILLNESS
[Orthovisc] : Orthovisc [Gradual] : gradual [5] : 5 [4] : 4 [Intermittent] : intermittent [2] : 2 [de-identified] : 09/16/2022 right knee orthovisc injections # 3 [] : no [FreeTextEntry1] : RIGHT KNEE  [de-identified] : orthovisc injections  [de-identified] : 9/16/22 [de-identified] : right knee [de-identified] : orthovisc

## 2022-11-18 ENCOUNTER — APPOINTMENT (OUTPATIENT)
Dept: ORTHOPEDIC SURGERY | Facility: CLINIC | Age: 67
End: 2022-11-18

## 2022-11-18 PROCEDURE — 20610 DRAIN/INJ JOINT/BURSA W/O US: CPT | Mod: RT

## 2022-11-18 PROCEDURE — 99214 OFFICE O/P EST MOD 30 MIN: CPT | Mod: 25

## 2022-11-18 PROCEDURE — J3490M: CUSTOM

## 2022-11-18 NOTE — IMAGING
[de-identified] : Right Hip/Thigh: Inspection of the hip/thigh is as follows: Inspection shows no swelling. Range of motion of the hip\par is as follows: limited internal rotation and limited external rotation. \par \par Left Hip/Thigh: Inspection of the hip/thigh is as follows: Inspection shows no swelling. Range of motion of the hip is\par as follows: limited internal rotation and limited external rotation. \par \par Right Knee: Inspection of the knee is as follows: no effusion, erythema, ecchymosis, scars or deformities. Palpation\par of the knee is as follows: medial joint line tenderness, lateral joint line tenderness, medial facet of patella\par tenderness, lateral facet of patella tenderness, patellar compression tenderness and crepitus about the\par patella. Knee Range of Motion is as follows: full flexion and extension without pain (0-140). Strength examination of the\par knee is as follows: Quadriceps strength is 5/5 Hamstring strength is 5/5 Ligament Stability and Special\par Test ligamentously stable. Neurological examination of the knee is as follows: light touch is intact throughout. \par \par Left Knee: Inspection of the knee is as follows: no effusion, erythema, ecchymosis, scars or deformities. Palpation of\par the knee is as follows: medial joint line tenderness, lateral joint line tenderness, medial facet of patella\par tenderness, lateral facet of patella tenderness, patellar compression tenderness and crepitus about the\par patella. Knee Range of Motion is as follows: full flexion and extension without pain (0-140). Strength examination of the\par knee is as follows: Quadriceps strength is 5/5 Hamstring strength is 5/5 Ligament Stability and Special\par Test ligamentously stable. Neurological examination of the knee is as follows: light touch is intact throughout.

## 2022-11-18 NOTE — ASSESSMENT
[FreeTextEntry1] : PT WITH ADVANCED RIGHT KNEE OA. SHE HAS TRIED MEDROL AND CYCLOBENZAPRINE FOR PAIN RELIEF. PAIN WORSENS WITH STAIRS AND WALKING LONG DISTANCES. PAIN IS AFFECTING ADL AND FUNCTIONAL ACTIVITIES. TREATMENT OPTIONS REVIEWED. DISCUSSED R&B OF TKA. \par \par COMPLETED ORTHOVISC SERIES ON 9/23/22. RT KNEE CSI TODAY. PATIENT TOLERATED INJECTION WELL.

## 2022-11-18 NOTE — PROCEDURE
[de-identified] : Procedure Name: Large Joint Injection / Aspiration: Depomedrol and Marcaine\par Anesthesia: ethyl chloride sprayed topically.. \par Depomedrol: An injection of Depomedrol 80 mg , 1 cc. \par Marcaine: 5 cc. \par Medication was injected in the right knee. Patient has tried OTC's including aspirin, Ibuprofen, Aleve etc or prescription\par NSAIDS, and/or exercises at home and/ or physical therapy without satisfactory response. After verbal consent using sterile preparation and technique. The risks, benefits, and alternatives to cortisone injection were explained in full to the patient. Risks outlined include but are not limited to infection, sepsis, bleeding, scarring, skin discoloration, temporary  increase in pain, syncopal episode, failure to resolve symptoms, allergic reaction, symptom recurrence, and elevation of blood sugar in diabetics. Patient understood the risks. All questions were answered. After discussion of options, patient requested an injection. Oral informed consent was obtained and sterile prep was done of the injection\par site. Sterile technique was utilized for the procedure including the preparation of the solutions used for the injection. Patient tolerated the procedure well. Advised to ice the injection site this evening. Prep with alcohol locally to site. Sterile technique used. Post Procedure Instructions: Patient was advised to call if redness, pain, or fever occur and\par apply ice for 15 min. out of every hour for the next 12-24 hours as tolerated.

## 2022-11-18 NOTE — HISTORY OF PRESENT ILLNESS
[Gradual] : gradual [5] : 5 [4] : 4 [Intermittent] : intermittent [2] : 2 [Orthovisc] : Orthovisc [de-identified] : 09/16/2022 right knee orthovisc injections # 3\par \par 11/18/22 pt stated she has made no improvement with pain nor range of motion  [] : no [de-identified] : orthovisc injections  [de-identified] : right knee [de-identified] : orthovisc

## 2023-03-28 ENCOUNTER — APPOINTMENT (OUTPATIENT)
Dept: ORTHOPEDIC SURGERY | Facility: CLINIC | Age: 68
End: 2023-03-28
Payer: COMMERCIAL

## 2023-03-28 VITALS — HEIGHT: 68 IN | BODY MASS INDEX: 33.49 KG/M2 | WEIGHT: 221 LBS

## 2023-03-28 DIAGNOSIS — Z78.9 OTHER SPECIFIED HEALTH STATUS: ICD-10-CM

## 2023-03-28 PROCEDURE — 99214 OFFICE O/P EST MOD 30 MIN: CPT | Mod: 25

## 2023-03-28 PROCEDURE — 20610 DRAIN/INJ JOINT/BURSA W/O US: CPT | Mod: RT

## 2023-03-28 NOTE — PROCEDURE
[de-identified] : Procedure Name: Orthovisc (Large Joint)\par \par Viscosupplementation Injection: X-ray evidence of Osteoarthritis on this or prior visit, Patient has tried OTC's including aspirin, Ibuprofen, Aleve etc or prescription NSAIDS, and/or exercises at home and/ or physical therapy without satisfactory response and Repeat series performed because patient had significant improvement in their pain and functional capacity from prior series which was given more than six months ago. \par \par An injection of Orthovisc 2ml #1 was injected into the right knee(s). The risks, benefits, and alternatives to Viscosupplementation injection were explained in full to the patient. Risks outlined include but are not limited to infection, sepsis, bleeding, scarring, skin discoloration, temporary increase in pain, syncopal episode, failure to resolve symptoms, allergic reaction, and symptom recurrence. Signs and symptoms of infection reviewed and patient advised to call immediately for redness, fevers, and/or chills. Patient understood the risks. All questions were answered. After discussion of options, patient requested Viscosupplementation. Oral informed consent was obtained and sterile prep was done of the injection site. The patient tolerated the procedure well. Ice tonight to the injection site. \par

## 2023-03-28 NOTE — HISTORY OF PRESENT ILLNESS
[Gradual] : gradual [5] : 5 [4] : 4 [Intermittent] : intermittent [2] : 2 [Orthovisc] : Orthovisc [de-identified] : 09/16/2022 right knee orthovisc injections # 3\par \par 11/18/22 pt stated she has made no improvement with pain nor range of motion \par \par 03/28/2023 here for a follow up on the right knee finished series of orthovisc injections in september 20222 with good relief  [] : no [de-identified] : orthovisc injections  [de-identified] : right knee [de-identified] : orthovisc

## 2023-03-28 NOTE — IMAGING
[de-identified] : Right Hip/Thigh: Inspection of the hip/thigh is as follows: Inspection shows no swelling. Range of motion of the hip\par is as follows: limited internal rotation and limited external rotation. \par \par Left Hip/Thigh: Inspection of the hip/thigh is as follows: Inspection shows no swelling. Range of motion of the hip is\par as follows: limited internal rotation and limited external rotation. \par \par Right Knee: Inspection of the knee is as follows: no effusion, erythema, ecchymosis, scars or deformities. Palpation\par of the knee is as follows: medial joint line tenderness, lateral joint line tenderness, medial facet of patella\par tenderness, lateral facet of patella tenderness, patellar compression tenderness and crepitus about the\par patella. Knee Range of Motion is as follows: full flexion and extension without pain (0-140). Strength examination of the\par knee is as follows: Quadriceps strength is 5/5 Hamstring strength is 5/5 Ligament Stability and Special\par Test ligamentously stable. Neurological examination of the knee is as follows: light touch is intact throughout. \par \par Left Knee: Inspection of the knee is as follows: no effusion, erythema, ecchymosis, scars or deformities. Palpation of\par the knee is as follows: medial joint line tenderness, lateral joint line tenderness, medial facet of patella\par tenderness, lateral facet of patella tenderness, patellar compression tenderness and crepitus about the\par patella. Knee Range of Motion is as follows: full flexion and extension without pain (0-140). Strength examination of the\par knee is as follows: Quadriceps strength is 5/5 Hamstring strength is 5/5 Ligament Stability and Special\par Test ligamentously stable. Neurological examination of the knee is as follows: light touch is intact throughout.

## 2023-03-28 NOTE — ASSESSMENT
[FreeTextEntry1] : PT WITH ADVANCED RIGHT KNEE OA. SHE HAS TRIED MEDROL AND CYCLOBENZAPRINE FOR PAIN RELIEF. PAIN WORSENS WITH STAIRS AND WALKING LONG DISTANCES. PAIN IS AFFECTING ADL AND FUNCTIONAL ACTIVITIES. TREATMENT OPTIONS REVIEWED. DISCUSSED R&B OF EVENTUAL TKA. \par \par RT KNEE ORTHOVISC #1 TODAY. PATIENT TOLERATED INJECTION WELL.

## 2023-04-04 ENCOUNTER — APPOINTMENT (OUTPATIENT)
Dept: ORTHOPEDIC SURGERY | Facility: CLINIC | Age: 68
End: 2023-04-04
Payer: COMMERCIAL

## 2023-04-04 VITALS — BODY MASS INDEX: 33.49 KG/M2 | WEIGHT: 221 LBS | HEIGHT: 68 IN

## 2023-04-04 PROCEDURE — 99213 OFFICE O/P EST LOW 20 MIN: CPT | Mod: 25

## 2023-04-04 PROCEDURE — 20610 DRAIN/INJ JOINT/BURSA W/O US: CPT

## 2023-04-04 NOTE — PROCEDURE
[de-identified] : Procedure Name: Orthovisc (Large Joint)\par \par Viscosupplementation Injection: X-ray evidence of Osteoarthritis on this or prior visit, Patient has tried OTC's including aspirin, Ibuprofen, Aleve etc or prescription NSAIDS, and/or exercises at home and/ or physical therapy without satisfactory response and Repeat series performed because patient had significant improvement in their pain and functional capacity from prior series which was given more than six months ago. \par \par An injection of Orthovisc 2ml #2 was injected into the right knee(s). The risks, benefits, and alternatives to Viscosupplementation injection were explained in full to the patient. Risks outlined include but are not limited to infection, sepsis, bleeding, scarring, skin discoloration, temporary increase in pain, syncopal episode, failure to resolve symptoms, allergic reaction, and symptom recurrence. Signs and symptoms of infection reviewed and patient advised to call immediately for redness, fevers, and/or chills. Patient understood the risks. All questions were answered. After discussion of options, patient requested Viscosupplementation. Oral informed consent was obtained and sterile prep was done of the injection site. The patient tolerated the procedure well. Ice tonight to the injection site. \par

## 2023-04-04 NOTE — ASSESSMENT
[FreeTextEntry1] : PT WITH ADVANCED RIGHT KNEE OA. SHE HAS TRIED MEDROL AND CYCLOBENZAPRINE FOR PAIN RELIEF. PAIN WORSENS WITH STAIRS AND WALKING LONG DISTANCES. PAIN IS AFFECTING ADL AND FUNCTIONAL ACTIVITIES. TREATMENT OPTIONS REVIEWED. DISCUSSED R&B OF EVENTUAL TKA. \par \par RT KNEE ORTHOVISC #2 TODAY. PATIENT TOLERATED INJECTION WELL.

## 2023-04-04 NOTE — IMAGING
[de-identified] : Right Hip/Thigh: Inspection of the hip/thigh is as follows: Inspection shows no swelling. Range of motion of the hip\par is as follows: limited internal rotation and limited external rotation. \par \par Left Hip/Thigh: Inspection of the hip/thigh is as follows: Inspection shows no swelling. Range of motion of the hip is\par as follows: limited internal rotation and limited external rotation. \par \par Right Knee: Inspection of the knee is as follows: no effusion, erythema, ecchymosis, scars or deformities. Palpation\par of the knee is as follows: medial joint line tenderness, lateral joint line tenderness, medial facet of patella\par tenderness, lateral facet of patella tenderness, patellar compression tenderness and crepitus about the\par patella. Knee Range of Motion is as follows: full flexion and extension without pain (0-140). Strength examination of the\par knee is as follows: Quadriceps strength is 5/5 Hamstring strength is 5/5 Ligament Stability and Special\par Test ligamentously stable. Neurological examination of the knee is as follows: light touch is intact throughout. \par \par Left Knee: Inspection of the knee is as follows: no effusion, erythema, ecchymosis, scars or deformities. Palpation of\par the knee is as follows: medial joint line tenderness, lateral joint line tenderness, medial facet of patella\par tenderness, lateral facet of patella tenderness, patellar compression tenderness and crepitus about the\par patella. Knee Range of Motion is as follows: full flexion and extension without pain (0-140). Strength examination of the\par knee is as follows: Quadriceps strength is 5/5 Hamstring strength is 5/5 Ligament Stability and Special\par Test ligamentously stable. Neurological examination of the knee is as follows: light touch is intact throughout.

## 2023-04-04 NOTE — HISTORY OF PRESENT ILLNESS
[Gradual] : gradual [5] : 5 [4] : 4 [Intermittent] : intermittent [2] : 2 [Orthovisc] : Orthovisc [de-identified] : 09/16/2022 right knee orthovisc injections # 3\par \par 11/18/22 pt stated she has made no improvement with pain nor range of motion \par \par 03/28/2023 here for a follow up on the right knee finished series of orthovisc injections in september 20222 with good relief \par \par 04/04/23 right knee orthovisc # 2  [] : no [de-identified] : orthovisc injections  [de-identified] : right knee [de-identified] : orthovisc

## 2023-04-11 ENCOUNTER — APPOINTMENT (OUTPATIENT)
Dept: ORTHOPEDIC SURGERY | Facility: CLINIC | Age: 68
End: 2023-04-11
Payer: COMMERCIAL

## 2023-04-11 VITALS — WEIGHT: 221 LBS | HEIGHT: 68 IN | BODY MASS INDEX: 33.49 KG/M2

## 2023-04-11 PROCEDURE — 20610 DRAIN/INJ JOINT/BURSA W/O US: CPT

## 2023-04-11 PROCEDURE — 99213 OFFICE O/P EST LOW 20 MIN: CPT | Mod: 25

## 2023-04-11 NOTE — ASSESSMENT
[FreeTextEntry1] : 67 year F WITH ADVANCED RIGHT KNEE OA. SHE HAS TRIED MEDROL AND CYCLOBENZAPRINE FOR PAIN RELIEF. PAIN WORSENS WITH STAIRS AND WALKING LONG DISTANCES. PAIN IS AFFECTING ADL AND FUNCTIONAL ACTIVITIES. TREATMENT OPTIONS REVIEWED. DISCUSSED R&B OF EVENTUAL TKA. \par \par RT KNEE ORTHOVISC #3 TODAY. PATIENT TOLERATED INJECTION WELL.

## 2023-04-11 NOTE — PROCEDURE
[de-identified] : Procedure Name: Orthovisc (Large Joint)\par \par Viscosupplementation Injection: X-ray evidence of Osteoarthritis on this or prior visit, Patient has tried OTC's including aspirin, Ibuprofen, Aleve etc or prescription NSAIDS, and/or exercises at home and/ or physical therapy without satisfactory response and Repeat series performed because patient had significant improvement in their pain and functional capacity from prior series which was given more than six months ago. \par \par An injection of Orthovisc 2ml #3 was injected into the right knee(s). The risks, benefits, and alternatives to Viscosupplementation injection were explained in full to the patient. Risks outlined include but are not limited to infection, sepsis, bleeding, scarring, skin discoloration, temporary increase in pain, syncopal episode, failure to resolve symptoms, allergic reaction, and symptom recurrence. Signs and symptoms of infection reviewed and patient advised to call immediately for redness, fevers, and/or chills. Patient understood the risks. All questions were answered. After discussion of options, patient requested Viscosupplementation. Oral informed consent was obtained and sterile prep was done of the injection site. The patient tolerated the procedure well. Ice tonight to the injection site. \par

## 2023-04-11 NOTE — HISTORY OF PRESENT ILLNESS
[Gradual] : gradual [5] : 5 [4] : 4 [Intermittent] : intermittent [2] : 2 [Orthovisc] : Orthovisc [de-identified] : 09/16/2022 right knee orthovisc injections # 3\par \par 11/18/22 pt stated she has made no improvement with pain nor range of motion \par \par 03/28/2023 here for a follow up on the right knee finished series of orthovisc injections in september 20222 with good relief \par \par 04/04/23 right knee orthovisc # 2 \par \par 04/11/23 right knee orthovisc # 3  [] : no [de-identified] : orthovisc injections  [de-identified] : right knee [de-identified] : orthovisc

## 2023-04-18 ENCOUNTER — APPOINTMENT (OUTPATIENT)
Dept: ORTHOPEDIC SURGERY | Facility: CLINIC | Age: 68
End: 2023-04-18
Payer: COMMERCIAL

## 2023-04-18 VITALS — HEIGHT: 68 IN | BODY MASS INDEX: 33.49 KG/M2 | WEIGHT: 221 LBS

## 2023-04-18 PROCEDURE — 20610 DRAIN/INJ JOINT/BURSA W/O US: CPT | Mod: RT

## 2023-04-18 PROCEDURE — 99213 OFFICE O/P EST LOW 20 MIN: CPT | Mod: 25

## 2023-04-18 RX ORDER — DICLOFENAC SODIUM 75 MG/1
75 TABLET, DELAYED RELEASE ORAL
Qty: 60 | Refills: 0 | Status: ACTIVE | COMMUNITY
Start: 2023-04-18 | End: 1900-01-01

## 2023-04-18 NOTE — IMAGING
Patient was referred by Dr. Lay.  Patient is having problems with depression especially prior to her period.  Having some side effects from her Lexapro.  Patient was called and a message was left on her answering machine.    [de-identified] : Right Hip/Thigh: Inspection of the hip/thigh is as follows: Inspection shows no swelling. Range of motion of the hip\par is as follows: limited internal rotation and limited external rotation. \par \par Left Hip/Thigh: Inspection of the hip/thigh is as follows: Inspection shows no swelling. Range of motion of the hip is\par as follows: limited internal rotation and limited external rotation. \par \par Right Knee: Inspection of the knee is as follows: no effusion, erythema, ecchymosis, scars or deformities. Palpation\par of the knee is as follows: medial joint line tenderness, lateral joint line tenderness, medial facet of patella\par tenderness, lateral facet of patella tenderness, patellar compression tenderness and crepitus about the\par patella. Knee Range of Motion is as follows: full flexion and extension without pain (0-140). Strength examination of the\par knee is as follows: Quadriceps strength is 5/5 Hamstring strength is 5/5 Ligament Stability and Special\par Test ligamentously stable. Neurological examination of the knee is as follows: light touch is intact throughout. \par \par Left Knee: Inspection of the knee is as follows: no effusion, erythema, ecchymosis, scars or deformities. Palpation of\par the knee is as follows: medial joint line tenderness, lateral joint line tenderness, medial facet of patella\par tenderness, lateral facet of patella tenderness, patellar compression tenderness and crepitus about the\par patella. Knee Range of Motion is as follows: full flexion and extension without pain (0-140). Strength examination of the\par knee is as follows: Quadriceps strength is 5/5 Hamstring strength is 5/5 Ligament Stability and Special\par Test ligamentously stable. Neurological examination of the knee is as follows: light touch is intact throughout.

## 2023-04-18 NOTE — ASSESSMENT
[FreeTextEntry1] : 67 year F WITH ADVANCED RIGHT KNEE OA. SHE HAS TRIED MEDROL AND CYCLOBENZAPRINE FOR PAIN RELIEF. PAIN WORSENS WITH STAIRS AND WALKING LONG DISTANCES. PAIN IS AFFECTING ADL AND FUNCTIONAL ACTIVITIES. TREATMENT OPTIONS REVIEWED. DISCUSSED R&B OF EVENTUAL TKA. \par \par RT KNEE ORTHOVISC #4 TODAY. PATIENT TOLERATED INJECTION WELL.

## 2023-04-18 NOTE — PROCEDURE
[de-identified] : Procedure Name: Orthovisc (Large Joint)\par \par Viscosupplementation Injection: X-ray evidence of Osteoarthritis on this or prior visit, Patient has tried OTC's including aspirin, Ibuprofen, Aleve etc or prescription NSAIDS, and/or exercises at home and/ or physical therapy without satisfactory response and Repeat series performed because patient had significant improvement in their pain and functional capacity from prior series which was given more than six months ago. \par \par An injection of Orthovisc 2ml #4 was injected into the right knee(s). The risks, benefits, and alternatives to Viscosupplementation injection were explained in full to the patient. Risks outlined include but are not limited to infection, sepsis, bleeding, scarring, skin discoloration, temporary increase in pain, syncopal episode, failure to resolve symptoms, allergic reaction, and symptom recurrence. Signs and symptoms of infection reviewed and patient advised to call immediately for redness, fevers, and/or chills. Patient understood the risks. All questions were answered. After discussion of options, patient requested Viscosupplementation. Oral informed consent was obtained and sterile prep was done of the injection site. The patient tolerated the procedure well. Ice tonight to the injection site. \par

## 2023-04-18 NOTE — HISTORY OF PRESENT ILLNESS
[Gradual] : gradual [5] : 5 [4] : 4 [Intermittent] : intermittent [2] : 2 [Orthovisc] : Orthovisc [de-identified] : 09/16/2022 right knee orthovisc injections # 3\par \par 11/18/22 pt stated she has made no improvement with pain nor range of motion \par \par 03/28/2023 here for a follow up on the right knee finished series of orthovisc injections in september 20222 with good relief \par \par 04/04/23 right knee orthovisc # 2 \par \par 04/11/23 right knee orthovisc # 3 \par \par 04/18/23 right knee orthovisc # 4  [] : no [de-identified] : orthovisc injections  [de-identified] : right knee [de-identified] : orthovisc

## 2023-05-04 ENCOUNTER — APPOINTMENT (OUTPATIENT)
Dept: ORTHOPEDIC SURGERY | Facility: CLINIC | Age: 68
End: 2023-05-04
Payer: COMMERCIAL

## 2023-05-04 VITALS — BODY MASS INDEX: 33.49 KG/M2 | HEIGHT: 68 IN | WEIGHT: 221 LBS

## 2023-05-04 PROCEDURE — 73010 X-RAY EXAM OF SHOULDER BLADE: CPT | Mod: RT

## 2023-05-04 PROCEDURE — 73030 X-RAY EXAM OF SHOULDER: CPT | Mod: RT

## 2023-05-04 PROCEDURE — 99214 OFFICE O/P EST MOD 30 MIN: CPT

## 2023-05-04 PROCEDURE — 72040 X-RAY EXAM NECK SPINE 2-3 VW: CPT

## 2023-05-04 RX ORDER — METHYLPREDNISOLONE 4 MG/1
4 TABLET ORAL
Qty: 1 | Refills: 0 | Status: ACTIVE | COMMUNITY
Start: 2023-05-04 | End: 1900-01-01

## 2023-05-04 NOTE — PHYSICAL EXAM
[Bilateral] : shoulder bilaterally [5 ___] : forward flexion 5[unfilled]/5 [5___] : internal rotation 5[unfilled]/5 [] : discomfort with strength testing [FreeTextEntry9] :  b/l\par ER 60 b/l [de-identified] : L FF 4\par L ER 4

## 2023-05-04 NOTE — IMAGING
[Disc space narrowing] : Disc space narrowing [Implants in position] : Implants in position [Right] : right shoulder [Glenohumeral arthritis] : Glenohumeral arthritis [There are no fractures, subluxations or dislocations. No significant abnormalities are seen] : There are no fractures, subluxations or dislocations. No significant abnormalities are seen

## 2023-05-04 NOTE — ASSESSMENT
[FreeTextEntry1] : Prior R SA, RCR. Mild R GH DJD.\par Cervical DDD with prior fusion 2013.\par L shoulder full thickness supra, infra tears, s/p Left SA, SAD, GHD, biceps tenotomy. Irreparable rotator cuff tear.\par Given L SA injection 11/26/19 with good relief. \par Continue HEP, WBAT both shoulders.\par Her left shoulder has improved significantly.\par Start MDP.\par Heat, ice.\par Consider R SA injection if pain persists.\par RTO 2-3 weeks.

## 2023-05-04 NOTE — HISTORY OF PRESENT ILLNESS
[6] : 6 [Dull/Aching] : dull/aching [Radiating] : radiating [] : yes [Full time] : Work status: full time [de-identified] : DOS: 7/22/19: L SA, SAD, acromioplasty, GHD, syn, biceps tenotomy. Irreparable rotator cuff tear, 11/27/17 R SA, SAD, acromioplasty, RCR, biceps tenotomy\par \par 5/4/23: Here for follow up right shoulder. She states pain increased last few weeks. No new injury. It radiates to her upperarm. She has been taking diclofenac. She has occasional burning pain posteriorly.\par \par 10/27/21: Here for follow up. Denies injury. She has pain radiating into the upper back and some into hand and fingers with parestheias. She has a h/o a h/o ACDF in 2013 at [facility].\par \par 10/12/21: Here for follow up. She fell two months ago onto the knees. Her shoulders have been doing great, she does an HEP.\par \par 4/20/21: Here today for bilateral knees. She has had pain in both knees for the past four months. She has pain with walking, stairs. She takes aleve and ices.\par \par 6/2/20: Here for follow up. Her left shoulder has improved. She has been doing an HEP and her strength has improved.\par \par 3/20/20: Here today for the right knee. Pain and swelling continues. \par \par 2/5/20: Follow up right knee. she awoke a few days ago with significant pain in the knee. She has pain anterior and posterior, she had some swelling. She was able to walk but with pain.\par \par 1/7/20: Here for follow up, she is in PT and improving. she had the injection last visit.\par \par 11/26/19: Here for follow up. She is in PT at [facility] and she does see progress. Her motion has improved but her strength hasn't.\par \par 10/15/19: Here for follow up. She is frustrated with PT as she cant get visits scheduled due to the office issue. She feels her motion is intermittently improved. \par \par 9/3/19: Here for follow up. She has been in PT, and she does see some improvement. She still has significant weakness in elevation.\par \par 8/7/19: Here for first PO visit. She removed the sutures herself. Denies fever/chills. She stopped wearing sling after 3 days and has been using her arm for ADLs.\par \par 7/9/19: She saw Tammie who gave her a TPI. She has improved. She is in PT for the shoulder.\par \par 6/18/19: She is here today for the low back. She feels spasms. She denies radicular pain today. She has pain in central low back, worse with standing, sitting, walking.\par \par 6/4/19: Here for follow up, MRI review.\par \par MRI L shoulder:\par 1. Full-thickness retracted supraspinatus and infraspinatus tendon tears with superior migration of humeral head and supraspinatus and infraspinatus muscle atrophy further detailed above.\par 3. Suggestion of a degree of pseudoarthrosis of the superbly migrated humeral head in reference the undersurface acromion.\par 4. Mild medial subluxation of biceps tendon.\par 5. Biceps tendinosis.\par 6. Subscapularis tendinosis with articular sided fraying and muscle atrophy.\par 7. Frayed tear anterior extending to superior and posterior superior labrum.\par 8. Glenohumeral joint osteoarthropathy.\par 9. Findings suggesting subacromial impingement in the correct clinical setting.\par 2. Subdeltoid bursitis.\par \par 5/22/19: Here for follow up. The neck has improved as well as the knee. She still has some pain more anterior shoulder pain.\par \par 4/30/19: The injection in the knee improved and the radiculopathy also with the MDP. Her left shoulder is still sore. \par \par 4/10/19: 64 yo RHD female with left shoulder, neck, back and right knee pain. She reports soreness in her shoulder and neck. Her knee "pops" and is painful with twisting, pivoting. She saw Dr. Tyler for bunion. There is low back pain since Feb 2019. There is pain in her left buttock and thigh going up and down stairs. She has some pain in bilateral trapezius areas, she reports good motion and strength in both shoulders, including the right after RCR.\par \par \par  [FreeTextEntry1] : R shoulder [de-identified] : diclofenac

## 2023-05-10 NOTE — ASU DISCHARGE PLAN (ADULT/PEDIATRIC) - ***IN THE EVENT THAT YOU DEVELOP A COMPLICATION AND YOU ARE UNABLE TO REACH YOUR OWN PHYSICIAN, YOU MAY CONTACT:
Statement Selected Brow Lift Text: A midfrontal incision was made medially to the defect to allow access to the tissues just superior to the left eyebrow. Following careful dissection inferiorly in a supraperiosteal plane to the level of the left eyebrow, several 3-0 monocryl sutures were used to resuspend the eyebrow orbicularis oculi muscular unit to the superior frontal bone periosteum. This resulted in an appropriate reapproximation of static eyebrow symmetry and correction of the left brow ptosis.

## 2023-05-18 ENCOUNTER — APPOINTMENT (OUTPATIENT)
Dept: ORTHOPEDIC SURGERY | Facility: CLINIC | Age: 68
End: 2023-05-18
Payer: COMMERCIAL

## 2023-05-18 VITALS — WEIGHT: 221 LBS | BODY MASS INDEX: 33.49 KG/M2 | HEIGHT: 68 IN

## 2023-05-18 DIAGNOSIS — Z78.9 OTHER SPECIFIED HEALTH STATUS: ICD-10-CM

## 2023-05-18 PROCEDURE — 99213 OFFICE O/P EST LOW 20 MIN: CPT

## 2023-05-18 RX ORDER — DICLOFENAC SODIUM 75 MG/1
75 TABLET, DELAYED RELEASE ORAL TWICE DAILY
Qty: 60 | Refills: 2 | Status: COMPLETED | COMMUNITY
Start: 2023-05-18 | End: 2023-08-16

## 2023-05-18 NOTE — HISTORY OF PRESENT ILLNESS
[Dull/Aching] : dull/aching [Radiating] : radiating [Full time] : Work status: full time [3] : 3 [de-identified] : DOS: 7/22/19: L SA, SAD, acromioplasty, GHD, syn, biceps tenotomy. Irreparable rotator cuff tear, 11/27/17 R SA, SAD, acromioplasty, RCR, biceps tenotomy\par \par 5/18/23: Here for follow up. The radiating pain has resolved. There is some clicking in her neck. She took MDP with good relief.\par \par 5/4/23: Here for follow up right shoulder. She states pain increased last few weeks. No new injury. It radiates to her upperarm. She has been taking diclofenac. She has occasional burning pain posteriorly.\par \par 10/27/21: Here for follow up. Denies injury. She has pain radiating into the upper back and some into hand and fingers with parestheias. She has a h/o a h/o ACDF in 2013 at [facility].\par \par 10/12/21: Here for follow up. She fell two months ago onto the knees. Her shoulders have been doing great, she does an HEP.\par \par 4/20/21: Here today for bilateral knees. She has had pain in both knees for the past four months. She has pain with walking, stairs. She takes aleve and ices.\par \par 6/2/20: Here for follow up. Her left shoulder has improved. She has been doing an HEP and her strength has improved.\par \par 3/20/20: Here today for the right knee. Pain and swelling continues. \par \par 2/5/20: Follow up right knee. she awoke a few days ago with significant pain in the knee. She has pain anterior and posterior, she had some swelling. She was able to walk but with pain.\par \par 1/7/20: Here for follow up, she is in PT and improving. she had the injection last visit.\par \par 11/26/19: Here for follow up. She is in PT at [facility] and she does see progress. Her motion has improved but her strength hasn't.\par \par 10/15/19: Here for follow up. She is frustrated with PT as she cant get visits scheduled due to the office issue. She feels her motion is intermittently improved. \par \par 9/3/19: Here for follow up. She has been in PT, and she does see some improvement. She still has significant weakness in elevation.\par \par 8/7/19: Here for first PO visit. She removed the sutures herself. Denies fever/chills. She stopped wearing sling after 3 days and has been using her arm for ADLs.\par \par 7/9/19: She saw Tammie who gave her a TPI. She has improved. She is in PT for the shoulder.\par \par 6/18/19: She is here today for the low back. She feels spasms. She denies radicular pain today. She has pain in central low back, worse with standing, sitting, walking.\par \par 6/4/19: Here for follow up, MRI review.\par \par MRI L shoulder:\par 1. Full-thickness retracted supraspinatus and infraspinatus tendon tears with superior migration of humeral head and supraspinatus and infraspinatus muscle atrophy further detailed above.\par 3. Suggestion of a degree of pseudoarthrosis of the superbly migrated humeral head in reference the undersurface acromion.\par 4. Mild medial subluxation of biceps tendon.\par 5. Biceps tendinosis.\par 6. Subscapularis tendinosis with articular sided fraying and muscle atrophy.\par 7. Frayed tear anterior extending to superior and posterior superior labrum.\par 8. Glenohumeral joint osteoarthropathy.\par 9. Findings suggesting subacromial impingement in the correct clinical setting.\par 2. Subdeltoid bursitis.\par \par 5/22/19: Here for follow up. The neck has improved as well as the knee. She still has some pain more anterior shoulder pain.\par \par 4/30/19: The injection in the knee improved and the radiculopathy also with the MDP. Her left shoulder is still sore. \par \par 4/10/19: 64 yo RHD female with left shoulder, neck, back and right knee pain. She reports soreness in her shoulder and neck. Her knee "pops" and is painful with twisting, pivoting. She saw Dr. Tyler for bunion. There is low back pain since Feb 2019. There is pain in her left buttock and thigh going up and down stairs. She has some pain in bilateral trapezius areas, she reports good motion and strength in both shoulders, including the right after RCR.\par \par \par  [] : no [FreeTextEntry1] : R shoulder [FreeTextEntry5] : reports  doing much better since last visit. no longer experiencing radiating pain.  [de-identified] : diclofenac

## 2023-05-18 NOTE — ASSESSMENT
[FreeTextEntry1] : Prior R SA, RCR. Mild R GH DJD.\par Cervical DDD with prior fusion 2013.\par L shoulder full thickness supra, infra tears, s/p Left SA, SAD, GHD, biceps tenotomy. Irreparable rotator cuff tear.\par Given L SA injection 11/26/19 with good relief. \par Continue HEP, WBAT both shoulders.\par Her left shoulder has improved significantly.\par Completed MDP, good relief.\par Heat, ice.\par Consider R SA injection if pain persists.\par Start PT for c-spine.\par Diclofenac prn.\par May consider pain mgt follow up.\par Discussed timing of repeat MDP.\par RTO 6-8 weeks.

## 2023-05-18 NOTE — PHYSICAL EXAM
[Bilateral] : shoulder bilaterally [5 ___] : forward flexion 5[unfilled]/5 [5___] : internal rotation 5[unfilled]/5 [] : discomfort with strength testing [FreeTextEntry9] :  b/l\par ER 60 b/l [de-identified] : L FF 4\par L ER 4

## 2023-06-29 ENCOUNTER — APPOINTMENT (OUTPATIENT)
Dept: ORTHOPEDIC SURGERY | Facility: CLINIC | Age: 68
End: 2023-06-29
Payer: COMMERCIAL

## 2023-06-29 VITALS — BODY MASS INDEX: 33.49 KG/M2 | HEIGHT: 68 IN | WEIGHT: 221 LBS

## 2023-06-29 DIAGNOSIS — M50.90 CERVICAL DISC DISORDER, UNSPECIFIED, UNSPECIFIED CERVICAL REGION: ICD-10-CM

## 2023-06-29 DIAGNOSIS — M75.122 COMPLETE ROTATOR CUFF TEAR OR RUPTURE OF LEFT SHOULDER, NOT SPECIFIED AS TRAUMATIC: ICD-10-CM

## 2023-06-29 PROCEDURE — 20611 DRAIN/INJ JOINT/BURSA W/US: CPT | Mod: RT

## 2023-06-29 PROCEDURE — 99213 OFFICE O/P EST LOW 20 MIN: CPT | Mod: 25

## 2023-06-29 PROCEDURE — J3490M: CUSTOM

## 2023-06-29 NOTE — PHYSICAL EXAM
[Bilateral] : shoulder bilaterally [5 ___] : forward flexion 5[unfilled]/5 [5___] : internal rotation 5[unfilled]/5 [] : discomfort with strength testing [FreeTextEntry9] :  b/l\par ER 60 b/l [de-identified] : L FF 4\par L ER 4

## 2023-06-29 NOTE — ASSESSMENT
[FreeTextEntry1] : Prior R SA, RCR. Mild R GH DJD.\par Cervical DDD with prior fusion 2013.\par L shoulder full thickness supra, infra tears, s/p Left SA, SAD, GHD, biceps tenotomy. Irreparable rotator cuff tear.\par Given L SA injection 11/26/19 with good relief. \par Her left shoulder has improved significantly.\par Completed MDP, good relief.\par Diclofenac prn.\par May consider pain mgt follow up.\par R Sa injection today.\par RTO prn.\par \par Procedure Note:\par Large Joint Injection was performed because of pain and inflammation, failure of conservative treatment.  \par Medications:\par Depo-Medrol: 1 cc, 80 mg.\par Lidocaine: 2 cc, 1%. \par Marcaine: 2 cc, .25%. \par \par Medication was injected in the right subacromial space. Patient has tried OTC's including aspirin, Ibuprofen, Aleve etc or prescription NSAIDs, and/or exercises at home and/ or physical therapy without satisfactory response. The risks, benefits, and alternatives to cortisone injection were explained in full to the patient. Risks outlined include but are not limited to infection, sepsis, bleeding, scarring, skin discoloration, temporary increase in pain, syncopal episode, failure to resolve symptoms, allergic reaction, symptom recurrence, and elevation of blood sugar in diabetics. Patient understood the risks. All questions were answered. After discussion of options, patient requested an injection. Oral informed consent was obtained and sterile prep of the injection site was performed using alcohol. Sterile technique was utilized for the procedure including the preparation of the solutions used for the injection. Ethyl chloride spray was used topically.  Sterile technique used. Patient tolerated procedure well. Post Procedure Instructions: Patient was advised to call if redness, pain, or fever occur and apply ice for 15 min. out of every hour for the next 12-24 hours as tolerated. patient was advised to rest the joint(s) for 2 days.\par \par Ultrasound Guidance was used for the following reasons: for prior failure or difficult injection and to visualize tearing and inflammation.\par Ultrasound guided injection was performed of the shoulder, visualization of the needle and placement of injection was performed without complication.\par

## 2023-06-29 NOTE — HISTORY OF PRESENT ILLNESS
[de-identified] : DOS: 7/22/19: L SA, SAD, acromioplasty, GHD, syn, biceps tenotomy. Irreparable rotator cuff tear, 11/27/17 R SA, SAD, acromioplasty, RCR, biceps tenotomy\par \par 6/29/23: Here for follow up.  She continues to have pain in the neck and right shoulder.  She wants to try a SA injeciton. \par \par 5/18/23: Here for follow up. The radiating pain has resolved. There is some clicking in her neck. She took MDP with good relief.\par \par 5/4/23: Here for follow up right shoulder. She states pain increased last few weeks. No new injury. It radiates to her upperarm. She has been taking diclofenac. She has occasional burning pain posteriorly.\par \par 10/27/21: Here for follow up. Denies injury. She has pain radiating into the upper back and some into hand and fingers with parestheias. She has a h/o a h/o ACDF in 2013 at [facility].\par \par 10/12/21: Here for follow up. She fell two months ago onto the knees. Her shoulders have been doing great, she does an HEP.\par \par 4/20/21: Here today for bilateral knees. She has had pain in both knees for the past four months. She has pain with walking, stairs. She takes aleve and ices.\par \par 6/2/20: Here for follow up. Her left shoulder has improved. She has been doing an HEP and her strength has improved.\par \par 3/20/20: Here today for the right knee. Pain and swelling continues. \par \par 2/5/20: Follow up right knee. she awoke a few days ago with significant pain in the knee. She has pain anterior and posterior, she had some swelling. She was able to walk but with pain.\par \par 1/7/20: Here for follow up, she is in PT and improving. she had the injection last visit.\par \par 11/26/19: Here for follow up. She is in PT at [facility] and she does see progress. Her motion has improved but her strength hasn't.\par \par 10/15/19: Here for follow up. She is frustrated with PT as she cant get visits scheduled due to the office issue. She feels her motion is intermittently improved. \par \par 9/3/19: Here for follow up. She has been in PT, and she does see some improvement. She still has significant weakness in elevation.\par \par 8/7/19: Here for first PO visit. She removed the sutures herself. Denies fever/chills. She stopped wearing sling after 3 days and has been using her arm for ADLs.\par \par 7/9/19: She saw Tammie who gave her a TPI. She has improved. She is in PT for the shoulder.\par \par 6/18/19: She is here today for the low back. She feels spasms. She denies radicular pain today. She has pain in central low back, worse with standing, sitting, walking.\par \par 6/4/19: Here for follow up, MRI review.\par \par MRI L shoulder:\par 1. Full-thickness retracted supraspinatus and infraspinatus tendon tears with superior migration of humeral head and supraspinatus and infraspinatus muscle atrophy further detailed above.\par 3. Suggestion of a degree of pseudoarthrosis of the superbly migrated humeral head in reference the undersurface acromion.\par 4. Mild medial subluxation of biceps tendon.\par 5. Biceps tendinosis.\par 6. Subscapularis tendinosis with articular sided fraying and muscle atrophy.\par 7. Frayed tear anterior extending to superior and posterior superior labrum.\par 8. Glenohumeral joint osteoarthropathy.\par 9. Findings suggesting subacromial impingement in the correct clinical setting.\par 2. Subdeltoid bursitis.\par \par 5/22/19: Here for follow up. The neck has improved as well as the knee. She still has some pain more anterior shoulder pain.\par \par 4/30/19: The injection in the knee improved and the radiculopathy also with the MDP. Her left shoulder is still sore. \par \par 4/10/19: 64 yo RHD female with left shoulder, neck, back and right knee pain. She reports soreness in her shoulder and neck. Her knee "pops" and is painful with twisting, pivoting. She saw Dr. Tyler for bunion. There is low back pain since Feb 2019. There is pain in her left buttock and thigh going up and down stairs. She has some pain in bilateral trapezius areas, she reports good motion and strength in both shoulders, including the right after RCR.\par \par \par

## 2023-07-10 NOTE — ASU PATIENT PROFILE, ADULT - NS PRO AD INFO GIVEN Y
yes Bilateral Rotation Flap Text: The defect edges were debeveled with a #15 scalpel blade. Given the location of the defect, shape of the defect and the proximity to free margins a bilateral rotation flap was deemed most appropriate. Using a sterile surgical marker, an appropriate rotation flap was drawn incorporating the defect and placing the expected incisions within the relaxed skin tension lines where possible. The area thus outlined was incised deep to adipose tissue with a #15 scalpel blade. The skin margins were undermined to an appropriate distance in all directions utilizing iris scissors. Following this, the designed flap was carried over into the primary defect and sutured into place.

## 2023-09-01 ENCOUNTER — APPOINTMENT (OUTPATIENT)
Dept: ORTHOPEDIC SURGERY | Facility: CLINIC | Age: 68
End: 2023-09-01
Payer: COMMERCIAL

## 2023-09-01 VITALS — BODY MASS INDEX: 33.49 KG/M2 | HEIGHT: 68 IN | WEIGHT: 221 LBS

## 2023-09-01 PROCEDURE — 20610 DRAIN/INJ JOINT/BURSA W/O US: CPT | Mod: RT

## 2023-09-01 PROCEDURE — J3490M: CUSTOM

## 2023-09-01 NOTE — ASSESSMENT
[FreeTextEntry1] : 67 year F WITH ADVANCED RIGHT KNEE OA. SHE HAS TRIED MEDROL AND CYCLOBENZAPRINE FOR PAIN RELIEF. PAIN WORSENS WITH STAIRS AND WALKING LONG DISTANCES. PAIN IS AFFECTING ADL AND FUNCTIONAL ACTIVITIES. TREATMENT OPTIONS REVIEWED. DISCUSSED R&B OF EVENTUAL TKA.   RT KNEE CSI TODAY. PATIENT TOLERATED INJECTION WELL.

## 2023-09-01 NOTE — IMAGING
[de-identified] : Right Hip/Thigh: Inspection of the hip/thigh is as follows: Inspection shows no swelling. Range of motion of the hip\par  is as follows: limited internal rotation and limited external rotation. \par  \par  Left Hip/Thigh: Inspection of the hip/thigh is as follows: Inspection shows no swelling. Range of motion of the hip is\par  as follows: limited internal rotation and limited external rotation. \par  \par  Right Knee: Inspection of the knee is as follows: no effusion, erythema, ecchymosis, scars or deformities. Palpation\par  of the knee is as follows: medial joint line tenderness, lateral joint line tenderness, medial facet of patella\par  tenderness, lateral facet of patella tenderness, patellar compression tenderness and crepitus about the\par  patella. Knee Range of Motion is as follows: full flexion and extension without pain (0-140). Strength examination of the\par  knee is as follows: Quadriceps strength is 5/5 Hamstring strength is 5/5 Ligament Stability and Special\par  Test ligamentously stable. Neurological examination of the knee is as follows: light touch is intact throughout. \par  \par  Left Knee: Inspection of the knee is as follows: no effusion, erythema, ecchymosis, scars or deformities. Palpation of\par  the knee is as follows: medial joint line tenderness, lateral joint line tenderness, medial facet of patella\par  tenderness, lateral facet of patella tenderness, patellar compression tenderness and crepitus about the\par  patella. Knee Range of Motion is as follows: full flexion and extension without pain (0-140). Strength examination of the\par  knee is as follows: Quadriceps strength is 5/5 Hamstring strength is 5/5 Ligament Stability and Special\par  Test ligamentously stable. Neurological examination of the knee is as follows: light touch is intact throughout.

## 2023-09-01 NOTE — PROCEDURE
[de-identified] : Procedure Name: Large Joint Injection / Aspiration: Depomedrol and Marcaine Anesthesia: ethyl chloride sprayed topically..  Depomedrol: An injection of Depomedrol 80 mg , 1 cc.  Marcaine: 5 cc.  Medication was injected in the right knee. Patient has tried OTC's including aspirin, Ibuprofen, Aleve etc or prescription NSAIDS, and/or exercises at home and/ or physical therapy without satisfactory response. After verbal consent using sterile preparation and technique. The risks, benefits, and alternatives to cortisone injection were explained in full to the patient. Risks outlined include but are not limited to infection, sepsis, bleeding, scarring, skin discoloration, temporary  increase in pain, syncopal episode, failure to resolve symptoms, allergic reaction, symptom recurrence, and elevation of blood sugar in diabetics. Patient understood the risks. All questions were answered. After discussion of options, patient requested an injection. Oral informed consent was obtained and sterile prep was done of the injection site. Sterile technique was utilized for the procedure including the preparation of the solutions used for the injection. Patient tolerated the procedure well. Advised to ice the injection site this evening. Prep with alcohol locally to site. Sterile technique used. Post Procedure Instructions: Patient was advised to call if redness, pain, or fever occur and apply ice for 15 min. out of every hour for the next 12-24 hours as tolerated.

## 2023-09-01 NOTE — HISTORY OF PRESENT ILLNESS
[Gradual] : gradual [5] : 5 [4] : 4 [Intermittent] : intermittent [Rest] : rest [Injection therapy] : injection therapy [Walking] : walking [2] : 2 [Orthovisc] : Orthovisc [de-identified] : 09/16/2022 right knee orthovisc injections # 3  11/18/22 pt stated she has made no improvement with pain nor range of motion   03/28/2023 here for a follow up on the right knee finished series of orthovisc injections in september 20222 with good relief   04/04/23 right knee orthovisc # 2   04/11/23 right knee orthovisc # 3   04/18/23 right knee orthovisc # 4   09/01/23 here for a follow up right knee finished series of orthovisc 04/18/23 with some relief  [] : no [de-identified] : orthovisc injections  [de-identified] : right knee [de-identified] : orthovisc

## 2023-11-30 ENCOUNTER — APPOINTMENT (OUTPATIENT)
Dept: ORTHOPEDIC SURGERY | Facility: CLINIC | Age: 68
End: 2023-11-30
Payer: COMMERCIAL

## 2023-11-30 VITALS — HEIGHT: 68 IN | BODY MASS INDEX: 33.49 KG/M2 | WEIGHT: 221 LBS

## 2023-11-30 DIAGNOSIS — M19.011 PRIMARY OSTEOARTHRITIS, RIGHT SHOULDER: ICD-10-CM

## 2023-11-30 DIAGNOSIS — M75.81 OTHER SHOULDER LESIONS, RIGHT SHOULDER: ICD-10-CM

## 2023-11-30 PROCEDURE — 20611 DRAIN/INJ JOINT/BURSA W/US: CPT | Mod: RT

## 2023-11-30 PROCEDURE — 99213 OFFICE O/P EST LOW 20 MIN: CPT | Mod: 25

## 2023-11-30 PROCEDURE — J3490M: CUSTOM

## 2023-12-19 ENCOUNTER — APPOINTMENT (OUTPATIENT)
Dept: ORTHOPEDIC SURGERY | Facility: CLINIC | Age: 68
End: 2023-12-19
Payer: COMMERCIAL

## 2023-12-19 PROCEDURE — ZZZZZ: CPT

## 2023-12-19 NOTE — HISTORY OF PRESENT ILLNESS
[Gradual] : gradual [5] : 5 [4] : 4 [Intermittent] : intermittent [Rest] : rest [Injection therapy] : injection therapy [Walking] : walking [2] : 2 [Orthovisc] : Orthovisc [de-identified] : 09/16/2022 right knee orthovisc injections # 3  11/18/22 pt stated she has made no improvement with pain nor range of motion   03/28/2023 here for a follow up on the right knee finished series of orthovisc injections in september 20222 with good relief   04/04/23 right knee orthovisc # 2   04/11/23 right knee orthovisc # 3   04/18/23 right knee orthovisc # 4   09/01/23 here for a follow up right knee finished series of orthovisc 04/18/23 with some relief   12/19/23 follow up right knee [] : no [de-identified] : orthovisc injections  [de-identified] : right knee [de-identified] : orthovisc

## 2023-12-19 NOTE — PROCEDURE
[de-identified] : Procedure Name: Large Joint Injection / Aspiration: Depomedrol and Marcaine Anesthesia: ethyl chloride sprayed topically..  Depomedrol: An injection of Depomedrol 80 mg , 1 cc.  Marcaine: 5 cc.  Medication was injected in the right knee. Patient has tried OTC's including aspirin, Ibuprofen, Aleve etc or prescription NSAIDS, and/or exercises at home and/ or physical therapy without satisfactory response. After verbal consent using sterile preparation and technique. The risks, benefits, and alternatives to cortisone injection were explained in full to the patient. Risks outlined include but are not limited to infection, sepsis, bleeding, scarring, skin discoloration, temporary  increase in pain, syncopal episode, failure to resolve symptoms, allergic reaction, symptom recurrence, and elevation of blood sugar in diabetics. Patient understood the risks. All questions were answered. After discussion of options, patient requested an injection. Oral informed consent was obtained and sterile prep was done of the injection site. Sterile technique was utilized for the procedure including the preparation of the solutions used for the injection. Patient tolerated the procedure well. Advised to ice the injection site this evening. Prep with alcohol locally to site. Sterile technique used. Post Procedure Instructions: Patient was advised to call if redness, pain, or fever occur and apply ice for 15 min. out of every hour for the next 12-24 hours as tolerated.

## 2023-12-19 NOTE — ASSESSMENT
[FreeTextEntry1] : 68 year F WITH ADVANCED RIGHT KNEE OA. SHE HAS TRIED MEDROL AND CYCLOBENZAPRINE FOR PAIN RELIEF. HAD CSI 9/1/23 WITH SOME RELIEF. PAIN WORSENS WITH STAIRS AND WALKING LONG DISTANCES. PAIN IS AFFECTING ADL AND FUNCTIONAL ACTIVITIES. TREATMENT OPTIONS REVIEWED. DISCUSSED R&B OF EVENTUAL TKA.  RT KNEE CSI TODAY. PATIENT TOLERATED INJECTION WELL.

## 2024-01-24 NOTE — H&P PST ADULT - PROBLEM SELECTOR PLAN 4
proceed with right rotator cuff repair 11/27  Tylenol for pain management  medical clearance by PMD 11/20

## 2024-03-08 ENCOUNTER — APPOINTMENT (OUTPATIENT)
Dept: ORTHOPEDIC SURGERY | Facility: CLINIC | Age: 69
End: 2024-03-08

## 2024-03-15 ENCOUNTER — APPOINTMENT (OUTPATIENT)
Dept: ORTHOPEDIC SURGERY | Facility: CLINIC | Age: 69
End: 2024-03-15
Payer: COMMERCIAL

## 2024-03-15 DIAGNOSIS — M17.11 UNILATERAL PRIMARY OSTEOARTHRITIS, RIGHT KNEE: ICD-10-CM

## 2024-03-15 PROCEDURE — ZZZZZ: CPT

## 2024-03-15 RX ORDER — POTASSIUM 75 MG
75 TABLET ORAL
Refills: 0 | Status: ACTIVE | COMMUNITY

## 2024-03-15 RX ORDER — FUROSEMIDE 80 MG/1
TABLET ORAL
Refills: 0 | Status: ACTIVE | COMMUNITY

## 2024-03-15 RX ORDER — LEVOTHYROXINE SODIUM 137 UG/1
TABLET ORAL
Refills: 0 | Status: ACTIVE | COMMUNITY

## 2024-03-15 NOTE — IMAGING
[de-identified] : Right Hip/Thigh: Inspection of the hip/thigh is as follows: Inspection shows no swelling. Range of motion of the hip\par  is as follows: limited internal rotation and limited external rotation. \par  \par  Left Hip/Thigh: Inspection of the hip/thigh is as follows: Inspection shows no swelling. Range of motion of the hip is\par  as follows: limited internal rotation and limited external rotation. \par  \par  Right Knee: Inspection of the knee is as follows: no effusion, erythema, ecchymosis, scars or deformities. Palpation\par  of the knee is as follows: medial joint line tenderness, lateral joint line tenderness, medial facet of patella\par  tenderness, lateral facet of patella tenderness, patellar compression tenderness and crepitus about the\par  patella. Knee Range of Motion is as follows: full flexion and extension without pain (0-140). Strength examination of the\par  knee is as follows: Quadriceps strength is 5/5 Hamstring strength is 5/5 Ligament Stability and Special\par  Test ligamentously stable. Neurological examination of the knee is as follows: light touch is intact throughout. \par  \par  Left Knee: Inspection of the knee is as follows: no effusion, erythema, ecchymosis, scars or deformities. Palpation of\par  the knee is as follows: medial joint line tenderness, lateral joint line tenderness, medial facet of patella\par  tenderness, lateral facet of patella tenderness, patellar compression tenderness and crepitus about the\par  patella. Knee Range of Motion is as follows: full flexion and extension without pain (0-140). Strength examination of the\par  knee is as follows: Quadriceps strength is 5/5 Hamstring strength is 5/5 Ligament Stability and Special\par  Test ligamentously stable. Neurological examination of the knee is as follows: light touch is intact throughout.

## 2024-03-15 NOTE — PROCEDURE
[de-identified] : Procedure Name: Large Joint Injection / Aspiration: Depomedrol and Marcaine Anesthesia: ethyl chloride sprayed topically..  Depomedrol: An injection of Depomedrol 80 mg , 1 cc.  Marcaine: 5 cc.  Medication was injected in the right knee. Patient has tried OTC's including aspirin, Ibuprofen, Aleve etc or prescription NSAIDS, and/or exercises at home and/ or physical therapy without satisfactory response. After verbal consent using sterile preparation and technique. The risks, benefits, and alternatives to cortisone injection were explained in full to the patient. Risks outlined include but are not limited to infection, sepsis, bleeding, scarring, skin discoloration, temporary  increase in pain, syncopal episode, failure to resolve symptoms, allergic reaction, symptom recurrence, and elevation of blood sugar in diabetics. Patient understood the risks. All questions were answered. After discussion of options, patient requested an injection. Oral informed consent was obtained and sterile prep was done of the injection site. Sterile technique was utilized for the procedure including the preparation of the solutions used for the injection. Patient tolerated the procedure well. Advised to ice the injection site this evening. Prep with alcohol locally to site. Sterile technique used. Post Procedure Instructions: Patient was advised to call if redness, pain, or fever occur and apply ice for 15 min. out of every hour for the next 12-24 hours as tolerated.

## 2024-03-15 NOTE — HISTORY OF PRESENT ILLNESS
[Gradual] : gradual [Intermittent] : intermittent [Rest] : rest [Injection therapy] : injection therapy [Walking] : walking [2] : 2 [Orthovisc] : Orthovisc [4] : 4 [3] : 3 [Throbbing] : throbbing [de-identified] : 09/16/2022 right knee orthovisc injections # 3  11/18/22 pt stated she has made no improvement with pain nor range of motion   03/28/2023 here for a follow up on the right knee finished series of orthovisc injections in september 20222 with good relief   04/04/23 right knee orthovisc # 2   04/11/23 right knee orthovisc # 3   04/18/23 right knee orthovisc # 4   09/01/23 here for a follow up right knee finished series of orthovisc 04/18/23 with some relief   12/19/23 follow up right knee [] : no [FreeTextEntry1] : right knee [de-identified] : orthovisc injections  [de-identified] : right knee [de-identified] : orthovisc

## 2024-05-21 NOTE — H&P PST ADULT - BLOOD AVOIDANCE/RESTRICTIONS, PROFILE
Siliq Counseling:  I discussed with the patient the risks of Siliq including but not limited to new or worsening depression, suicidal thoughts and behavior, immunosuppression, malignancy, posterior leukoencephalopathy syndrome, and serious infections.  The patient understands that monitoring is required including a PPD at baseline and must alert us or the primary physician if symptoms of infection or other concerning signs are noted. There is also a special program designed to monitor depression which is required with Siliq. Minoxidil Pregnancy And Lactation Text: This medication has not been assigned a Pregnancy Risk Category but animal studies failed to show danger with the topical medication. It is unknown if the medication is excreted in breast milk. Valtrex Pregnancy And Lactation Text: this medication is Pregnancy Category B and is considered safe during pregnancy. This medication is not directly found in breast milk but it's metabolite acyclovir is present. Topical Metronidazole Counseling: Metronidazole is a topical antibiotic medication. You may experience burning, stinging, redness, or allergic reactions.  Please call our office if you develop any problems from using this medication. Odomzo Counseling- I discussed with the patient the risks of Odomzo including but not limited to nausea, vomiting, diarrhea, constipation, weight loss, changes in the sense of taste, decreased appetite, muscle spasms, and hair loss.  The patient verbalized understanding of the proper use and possible adverse effects of Odomzo.  All of the patient's questions and concerns were addressed. Gabapentin Pregnancy And Lactation Text: This medication is Pregnancy Category C and isn't considered safe during pregnancy. It is excreted in breast milk. Spironolactone Pregnancy And Lactation Text: This medication can cause feminization of the male fetus and should be avoided during pregnancy. The active metabolite is also found in breast milk. Taltz Pregnancy And Lactation Text: The risk during pregnancy and breastfeeding is uncertain with this medication. Topical Retinoid Pregnancy And Lactation Text: This medication is Pregnancy Category C. It is unknown if this medication is excreted in breast milk. Propranolol Counseling:  I discussed with the patient the risks of propranolol including but not limited to low heart rate, low blood pressure, low blood sugar, restlessness and increased cold sensitivity. They should call the office if they experience any of these side effects. Methotrexate Counseling:  Patient counseled regarding adverse effects of methotrexate including but not limited to nausea, vomiting, abnormalities in liver function tests. Patients may develop mouth sores, rash, diarrhea, and abnormalities in blood counts. The patient understands that monitoring is required including LFT's and blood counts.  There is a rare possibility of scarring of the liver and lung problems that can occur when taking methotrexate. Persistent nausea, loss of appetite, pale stools, dark urine, cough, and shortness of breath should be reported immediately. Patient advised to discontinue methotrexate treatment at least three months before attempting to become pregnant.  I discussed the need for folate supplements while taking methotrexate.  These supplements can decrease side effects during methotrexate treatment. The patient verbalized understanding of the proper use and possible adverse effects of methotrexate.  All of the patient's questions and concerns were addressed. Sarecycline Counseling: Patient advised regarding possible photosensitivity and discoloration of the teeth, skin, lips, tongue and gums.  Patient instructed to avoid sunlight, if possible.  When exposed to sunlight, patients should wear protective clothing, sunglasses, and sunscreen.  The patient was instructed to call the office immediately if the following severe adverse effects occur:  hearing changes, easy bruising/bleeding, severe headache, or vision changes.  The patient verbalized understanding of the proper use and possible adverse effects of sarecycline.  All of the patient's questions and concerns were addressed. Carac Counseling:  I discussed with the patient the risks of Carac including but not limited to erythema, scaling, itching, weeping, crusting, and pain. Doxepin Pregnancy And Lactation Text: This medication is Pregnancy Category C and it isn't known if it is safe during pregnancy. It is also excreted in breast milk and breast feeding isn't recommended. Winlevi Pregnancy And Lactation Text: This medication is considered safe during pregnancy and breastfeeding. Itraconazole Counseling:  I discussed with the patient the risks of itraconazole including but not limited to liver damage, nausea/vomiting, neuropathy, and severe allergy.  The patient understands that this medication is best absorbed when taken with acidic beverages such as non-diet cola or ginger ale.  The patient understands that monitoring is required including baseline LFTs and repeat LFTs at intervals.  The patient understands that they are to contact us or the primary physician if concerning signs are noted. Enbrel Counseling:  I discussed with the patient the risks of etanercept including but not limited to myelosuppression, immunosuppression, autoimmune hepatitis, demyelinating diseases, lymphoma, and infections.  The patient understands that monitoring is required including a PPD at baseline and must alert us or the primary physician if symptoms of infection or other concerning signs are noted. none Azithromycin Pregnancy And Lactation Text: This medication is considered safe during pregnancy and is also secreted in breast milk. Nsaids Pregnancy And Lactation Text: These medications are considered safe up to 30 weeks gestation. It is excreted in breast milk. Sotyktu Counseling:  I discussed the most common side effects of Sotyktu including: common cold, sore throat, sinus infections, cold sores, canker sores, folliculitis, and acne.? I also discussed more serious side effects of Sotyktu including but not limited to: serious allergic reactions; increased risk for infections such as TB; cancers such as lymphomas; rhabdomyolysis and elevated CPK; and elevated triglycerides and liver enzymes.? Isotretinoin Pregnancy And Lactation Text: This medication is Pregnancy Category X and is considered extremely dangerous during pregnancy. It is unknown if it is excreted in breast milk. Ivermectin Pregnancy And Lactation Text: This medication is Pregnancy Category C and it isn't known if it is safe during pregnancy. It is also excreted in breast milk. Erythromycin Counseling:  I discussed with the patient the risks of erythromycin including but not limited to GI upset, allergic reaction, drug rash, diarrhea, increase in liver enzymes, and yeast infections. Rituxan Pregnancy And Lactation Text: This medication is Pregnancy Category C and it isn't know if it is safe during pregnancy. It is unknown if this medication is excreted in breast milk but similar antibodies are known to be excreted. Protopic Pregnancy And Lactation Text: This medication is Pregnancy Category C. It is unknown if this medication is excreted in breast milk when applied topically. Use Enhanced Medication Counseling?: No Libtayo Pregnancy And Lactation Text: This medication is contraindicated in pregnancy and when breast feeding. Minoxidil Counseling: Minoxidil is a topical medication which can increase blood flow where it is applied. It is uncertain how this medication increases hair growth. Side effects are uncommon and include stinging and allergic reactions. Topical Ketoconazole Pregnancy And Lactation Text: This medication is Pregnancy Category B and is considered safe during pregnancy. It is unknown if it is excreted in breast milk. Topical Retinoid counseling:  Patient advised to apply a pea-sized amount only at bedtime and wait 30 minutes after washing their face before applying.  If too drying, patient may add a non-comedogenic moisturizer. The patient verbalized understanding of the proper use and possible adverse effects of retinoids.  All of the patient's questions and concerns were addressed. Gabapentin Counseling: I discussed with the patient the risks of gabapentin including but not limited to dizziness, somnolence, fatigue and ataxia. Opioid Pregnancy And Lactation Text: These medications can lead to premature delivery and should be avoided during pregnancy. These medications are also present in breast milk in small amounts. Propranolol Pregnancy And Lactation Text: This medication is Pregnancy Category C and it isn't known if it is safe during pregnancy. It is excreted in breast milk. Methotrexate Pregnancy And Lactation Text: This medication is Pregnancy Category X and is known to cause fetal harm. This medication is excreted in breast milk. Rifampin Pregnancy And Lactation Text: This medication is Pregnancy Category C and it isn't know if it is safe during pregnancy. It is also excreted in breast milk and should not be used if you are breast feeding. Taltz Counseling: I discussed with the patient the risks of ixekizumab including but not limited to immunosuppression, serious infections, worsening of inflammatory bowel disease and drug reactions.  The patient understands that monitoring is required including a PPD at baseline and must alert us or the primary physician if symptoms of infection or other concerning signs are noted. Benzoyl Peroxide Pregnancy And Lactation Text: This medication is Pregnancy Category C. It is unknown if benzoyl peroxide is excreted in breast milk. Doxepin Counseling:  Patient advised that the medication is sedating and not to drive a car after taking this medication. Patient informed of potential adverse effects including but not limited to dry mouth, urinary retention, and blurry vision.  The patient verbalized understanding of the proper use and possible adverse effects of doxepin.  All of the patient's questions and concerns were addressed. Winlevi Counseling:  I discussed with the patient the risks of topical clascoterone including but not limited to erythema, scaling, itching, and stinging. Patient voiced their understanding. Dupixent Pregnancy And Lactation Text: This medication likely crosses the placenta but the risk for the fetus is uncertain. This medication is excreted in breast milk. Griseofulvin Pregnancy And Lactation Text: This medication is Pregnancy Category X and is known to cause serious birth defects. It is unknown if this medication is excreted in breast milk but breast feeding should be avoided. Azithromycin Counseling:  I discussed with the patient the risks of azithromycin including but not limited to GI upset, allergic reaction, drug rash, diarrhea, and yeast infections. Olanzapine Counseling- I discussed with the patient the common side effects of olanzapine including but are not limited to: lack of energy, dry mouth, increased appetite, sleepiness, tremor, constipation, dizziness, changes in behavior, or restlessness.  Explained that teenagers are more likely to experience headaches, abdominal pain, pain in the arms or legs, tiredness, and sleepiness.  Serious side effects include but are not limited: increased risk of death in elderly patients who are confused, have memory loss, or dementia-related psychosis; hyperglycemia; increased cholesterol and triglycerides; and weight gain. Isotretinoin Counseling: Patient should get monthly blood tests, not donate blood, not drive at night if vision affected, not share medication, and not undergo elective surgery for 6 months after tx completed. Side effects reviewed, pt to contact office should one occur. Azathioprine Counseling:  I discussed with the patient the risks of azathioprine including but not limited to myelosuppression, immunosuppression, hepatotoxicity, lymphoma, and infections.  The patient understands that monitoring is required including baseline LFTs, Creatinine, possible TPMP genotyping and weekly CBCs for the first month and then every 2 weeks thereafter.  The patient verbalized understanding of the proper use and possible adverse effects of azathioprine.  All of the patient's questions and concerns were addressed. Elidel Counseling: Patient may experience a mild burning sensation during topical application. Elidel is not approved in children less than 2 years of age. There have been case reports of hematologic and skin malignancies in patients using topical calcineurin inhibitors although causality is questionable. Doxycycline Pregnancy And Lactation Text: This medication is Pregnancy Category D and not consider safe during pregnancy. It is also excreted in breast milk but is considered safe for shorter treatment courses. Ivermectin Counseling:  Patient instructed to take medication on an empty stomach with a full glass of water.  Patient informed of potential adverse effects including but not limited to nausea, diarrhea, dizziness, itching, and swelling of the extremities or lymph nodes.  The patient verbalized understanding of the proper use and possible adverse effects of ivermectin.  All of the patient's questions and concerns were addressed. Rinvoq Pregnancy And Lactation Text: Based on animal studies, Rinvoq may cause embryo-fetal harm when administered to pregnant women.  The medication should not be used in pregnancy.  Breastfeeding is not recommended during treatment and for 6 days after the last dose. Rituxan Counseling:  I discussed with the patient the risks of Rituxan infusions. Side effects can include infusion reactions, severe drug rashes including mucocutaneous reactions, reactivation of latent hepatitis and other infections and rarely progressive multifocal leukoencephalopathy.  All of the patient's questions and concerns were addressed. Protopic Counseling: Patient may experience a mild burning sensation during topical application. Protopic is not approved in children less than 2 years of age. There have been case reports of hematologic and skin malignancies in patients using topical calcineurin inhibitors although causality is questionable. Klisyri Pregnancy And Lactation Text: It is unknown if this medication can harm a developing fetus or if it is excreted in breast milk. Libtayo Counseling- I discussed with the patient the risks of Libtayo including but not limited to nausea, vomiting, diarrhea, and bone or muscle pain.  The patient verbalized understanding of the proper use and possible adverse effects of Libtayo.  All of the patient's questions and concerns were addressed. Dapsone Pregnancy And Lactation Text: This medication is Pregnancy Category C and is not considered safe during pregnancy or breast feeding. SSKI Counseling:  I discussed with the patient the risks of SSKI including but not limited to thyroid abnormalities, metallic taste, GI upset, fever, headache, acne, arthralgias, paraesthesias, lymphadenopathy, easy bleeding, arrhythmias, and allergic reaction. Prednisone Counseling:  I discussed with the patient the risks of prolonged use of prednisone including but not limited to weight gain, insomnia, osteoporosis, mood changes, diabetes, susceptibility to infection, glaucoma and high blood pressure.  In cases where prednisone use is prolonged, patients should be monitored with blood pressure checks, serum glucose levels and an eye exam.  Additionally, the patient may need to be placed on GI prophylaxis, PCP prophylaxis, and calcium and vitamin D supplementation and/or a bisphosphonate.  The patient verbalized understanding of the proper use and the possible adverse effects of prednisone.  All of the patient's questions and concerns were addressed. Topical Ketoconazole Counseling: Patient counseled that this medication may cause skin irritation or allergic reactions.  In the event of skin irritation, the patient was advised to reduce the amount of the drug applied or use it less frequently.   The patient verbalized understanding of the proper use and possible adverse effects of ketoconazole.  All of the patient's questions and concerns were addressed. Soolantra Pregnancy And Lactation Text: This medication is Pregnancy Category C. This medication is considered safe during breast feeding. Rifampin Counseling: I discussed with the patient the risks of rifampin including but not limited to liver damage, kidney damage, red-orange body fluids, nausea/vomiting and severe allergy. Stelara Pregnancy And Lactation Text: This medication is Pregnancy Category B and is considered safe during pregnancy. It is unknown if this medication is excreted in breast milk. Cimetidine Pregnancy And Lactation Text: This medication is Pregnancy Category B and is considered safe during pregnancy. It is also excreted in breast milk and breast feeding isn't recommended. Opioid Counseling: I discussed with the patient the potential side effects of opioids including but not limited to addiction, altered mental status, and depression. I stressed avoiding alcohol, benzodiazepines, muscle relaxants and sleep aids unless specifically okayed by a physician. The patient verbalized understanding of the proper use and possible adverse effects of opioids. All of the patient's questions and concerns were addressed. They were instructed to flush the remaining pills down the toilet if they did not need them for pain. Griseofulvin Counseling:  I discussed with the patient the risks of griseofulvin including but not limited to photosensitivity, cytopenia, liver damage, nausea/vomiting and severe allergy.  The patient understands that this medication is best absorbed when taken with a fatty meal (e.g., ice cream or french fries). Wartpeel Pregnancy And Lactation Text: This medication is Pregnancy Category X and contraindicated in pregnancy and in women who may become pregnant. It is unknown if this medication is excreted in breast milk. Dupixent Counseling: I discussed with the patient the risks of dupilumab including but not limited to eye infection and irritation, cold sores, injection site reactions, worsening of asthma, allergic reactions and increased risk of parasitic infection.  Live vaccines should be avoided while taking dupilumab. Dupilumab will also interact with certain medications such as warfarin and cyclosporine. The patient understands that monitoring is required and they must alert us or the primary physician if symptoms of infection or other concerning signs are noted. Dutasteride Male Counseling: Dustasteride Counseling:  I discussed with the patient the risks of use of dutasteride including but not limited to decreased libido, decreased ejaculate volume, and gynecomastia. Women who can become pregnant should not handle medication.  All of the patient's questions and concerns were addressed. Benzoyl Peroxide Counseling: Patient counseled that medicine may cause skin irritation and bleach clothing.  In the event of skin irritation, the patient was advised to reduce the amount of the drug applied or use it less frequently.   The patient verbalized understanding of the proper use and possible adverse effects of benzoyl peroxide.  All of the patient's questions and concerns were addressed. Olanzapine Pregnancy And Lactation Text: This medication is pregnancy category C.   There are no adequate and well controlled trials with olanzapine in pregnant females.  Olanzapine should be used during pregnancy only if the potential benefit justifies the potential risk to the fetus.   In a study in lactating healthy women, olanzapine was excreted in breast milk.  It is recommended that women taking olanzapine should not breast feed. Azathioprine Pregnancy And Lactation Text: This medication is Pregnancy Category D and isn't considered safe during pregnancy. It is unknown if this medication is excreted in breast milk. Bexarotene Pregnancy And Lactation Text: This medication is Pregnancy Category X and should not be given to women who are pregnant or may become pregnant. This medication should not be used if you are breast feeding. Drysol Pregnancy And Lactation Text: This medication is considered safe during pregnancy and breast feeding. Doxycycline Counseling:  Patient counseled regarding possible photosensitivity and increased risk for sunburn.  Patient instructed to avoid sunlight, if possible.  When exposed to sunlight, patients should wear protective clothing, sunglasses, and sunscreen.  The patient was instructed to call the office immediately if the following severe adverse effects occur:  hearing changes, easy bruising/bleeding, severe headache, or vision changes.  The patient verbalized understanding of the proper use and possible adverse effects of doxycycline.  All of the patient's questions and concerns were addressed. Erivedge Pregnancy And Lactation Text: This medication is Pregnancy Category X and is absolutely contraindicated during pregnancy. It is unknown if it is excreted in breast milk. Rinvoq Counseling: I discussed with the patient the risks of Rinvoq therapy including but not limited to upper respiratory tract infections, shingles, cold sores, bronchitis, nausea, cough, fever, acne, and headache. Live vaccines should be avoided.  This medication has been linked to serious infections; higher rate of mortality; malignancy and lymphoproliferative disorders; major adverse cardiovascular events; thrombosis; thrombocytopenia, anemia, and neutropenia; lipid elevations; liver enzyme elevations; and gastrointestinal perforations. Dapsone Counseling: I discussed with the patient the risks of dapsone including but not limited to hemolytic anemia, agranulocytosis, rashes, methemoglobinemia, kidney failure, peripheral neuropathy, headaches, GI upset, and liver toxicity.  Patients who start dapsone require monitoring including baseline LFTs and weekly CBCs for the first month, then every month thereafter.  The patient verbalized understanding of the proper use and possible adverse effects of dapsone.  All of the patient's questions and concerns were addressed. Klisyri Counseling:  I discussed with the patient the risks of Klisyri including but not limited to erythema, scaling, itching, weeping, crusting, and pain. Quinolones Pregnancy And Lactation Text: This medication is Pregnancy Category C and it isn't know if it is safe during pregnancy. It is also excreted in breast milk. Stelara Counseling:  I discussed with the patient the risks of ustekinumab including but not limited to immunosuppression, malignancy, posterior leukoencephalopathy syndrome, and serious infections.  The patient understands that monitoring is required including a PPD at baseline and must alert us or the primary physician if symptoms of infection or other concerning signs are noted. Cimetidine Counseling:  I discussed with the patient the risks of Cimetidine including but not limited to gynecomastia, headache, diarrhea, nausea, drowsiness, arrhythmias, pancreatitis, skin rashes, psychosis, bone marrow suppression and kidney toxicity. Soolantra Counseling: I discussed with the patients the risks of topial Soolantra. This is a medicine which decreases the number of mites and inflammation in the skin. You experience burning, stinging, eye irritation or allergic reactions.  Please call our office if you develop any problems from using this medication. Sski Pregnancy And Lactation Text: This medication is Pregnancy Category D and isn't considered safe during pregnancy. It is excreted in breast milk. Prednisone Pregnancy And Lactation Text: This medication is Pregnancy Category C and it isn't know if it is safe during pregnancy. This medication is excreted in breast milk. Wartpeel Counseling:  I discussed with the patient the risks of Wartpeel including but not limited to erythema, scaling, itching, weeping, crusting, and pain. Low Dose Naltrexone Pregnancy And Lactation Text: Naltrexone is pregnancy category C.  There have been no adequate and well-controlled studies in pregnant women.  It should be used in pregnancy only if the potential benefit justifies the potential risk to the fetus.   Limited data indicates that naltrexone is minimally excreted into breastmilk. Dutasteride Pregnancy And Lactation Text: This medication is absolutely contraindicated in women, especially during pregnancy and breast feeding. Feminization of male fetuses is possible if taking while pregnant. Oral Minoxidil Counseling- I discussed with the patient the risks of oral minoxidil including but not limited to shortness of breath, swelling of the feet or ankles, dizziness, lightheadedness, unwanted hair growth and allergic reaction.  The patient verbalized understanding of the proper use and possible adverse effects of oral minoxidil.  All of the patient's questions and concerns were addressed. Cellcept Counseling:  I discussed with the patient the risks of mycophenolate mofetil including but not limited to infection/immunosuppression, GI upset, hypokalemia, hypercholesterolemia, bone marrow suppression, lymphoproliferative disorders, malignancy, GI ulceration/bleed/perforation, colitis, interstitial lung disease, kidney failure, progressive multifocal leukoencephalopathy, and birth defects.  The patient understands that monitoring is required including a baseline creatinine and regular CBC testing. In addition, patient must alert us immediately if symptoms of infection or other concerning signs are noted. Bexarotene Counseling:  I discussed with the patient the risks of bexarotene including but not limited to hair loss, dry lips/skin/eyes, liver abnormalities, hyperlipidemia, pancreatitis, depression/suicidal ideation, photosensitivity, drug rash/allergic reactions, hypothyroidism, anemia, leukopenia, infection, cataracts, and teratogenicity.  Patient understands that they will need regular blood tests to check lipid profile, liver function tests, white blood cell count, thyroid function tests and pregnancy test if applicable. Drysol Counseling:  I discussed with the patient the risks of drysol/aluminum chloride including but not limited to skin rash, itching, irritation, burning. Picato Counseling:  I discussed with the patient the risks of Picato including but not limited to erythema, scaling, itching, weeping, crusting, and pain. Olumiant Pregnancy And Lactation Text: Based on animal studies, Olumiant may cause embryo-fetal harm when administered to pregnant women.  The medication should not be used in pregnancy.  Breastfeeding is not recommended during treatment. Infliximab Counseling:  I discussed with the patient the risks of infliximab including but not limited to myelosuppression, immunosuppression, autoimmune hepatitis, demyelinating diseases, lymphoma, and serious infections.  The patient understands that monitoring is required including a PPD at baseline and must alert us or the primary physician if symptoms of infection or other concerning signs are noted. Erivedge Counseling- I discussed with the patient the risks of Erivedge including but not limited to nausea, vomiting, diarrhea, constipation, weight loss, changes in the sense of taste, decreased appetite, muscle spasms, and hair loss.  The patient verbalized understanding of the proper use and possible adverse effects of Erivedge.  All of the patient's questions and concerns were addressed. Clindamycin Pregnancy And Lactation Text: This medication can be used in pregnancy if certain situations. Clindamycin is also present in breast milk. Topical Clindamycin Counseling: Patient counseled that this medication may cause skin irritation or allergic reactions.  In the event of skin irritation, the patient was advised to reduce the amount of the drug applied or use it less frequently.   The patient verbalized understanding of the proper use and possible adverse effects of clindamycin.  All of the patient's questions and concerns were addressed. Quinolones Counseling:  I discussed with the patient the risks of fluoroquinolones including but not limited to GI upset, allergic reaction, drug rash, diarrhea, dizziness, photosensitivity, yeast infections, liver function test abnormalities, tendonitis/tendon rupture. Solaraze Pregnancy And Lactation Text: This medication is Pregnancy Category B and is considered safe. There is some data to suggest avoiding during the third trimester. It is unknown if this medication is excreted in breast milk. Thalidomide Counseling: I discussed with the patient the risks of thalidomide including but not limited to birth defects, anxiety, weakness, chest pain, dizziness, cough and severe allergy. Topical Sulfur Applications Pregnancy And Lactation Text: This medication is Pregnancy Category C and has an unknown safety profile during pregnancy. It is unknown if this topical medication is excreted in breast milk. Low Dose Naltrexone Counseling- I discussed with the patient the potential risks and side effects of low dose naltrexone including but not limited to: more vivid dreams, headaches, nausea, vomiting, abdominal pain, fatigue, dizziness, and anxiety. Finasteride Male Counseling: Finasteride Counseling:  I discussed with the patient the risks of use of finasteride including but not limited to decreased libido, decreased ejaculate volume, gynecomastia, and depression. Women should not handle medication.  All of the patient's questions and concerns were addressed. Oral Minoxidil Pregnancy And Lactation Text: This medication should only be used when clearly needed if you are pregnant, attempting to become pregnant or breast feeding. Acitretin Pregnancy And Lactation Text: This medication is Pregnancy Category X and should not be given to women who are pregnant or may become pregnant in the future. This medication is excreted in breast milk. Azelaic Acid Counseling: Patient counseled that medicine may cause skin irritation and to avoid applying near the eyes.  In the event of skin irritation, the patient was advised to reduce the amount of the drug applied or use it less frequently.   The patient verbalized understanding of the proper use and possible adverse effects of azelaic acid.  All of the patient's questions and concerns were addressed. Cosentyx Counseling:  I discussed with the patient the risks of Cosentyx including but not limited to worsening of Crohn's disease, immunosuppression, allergic reactions and infections.  The patient understands that monitoring is required including a PPD at baseline and must alert us or the primary physician if symptoms of infection or other concerning signs are noted. Fluconazole Counseling:  Patient counseled regarding adverse effects of fluconazole including but not limited to headache, diarrhea, nausea, upset stomach, liver function test abnormalities, taste disturbance, and stomach pain.  There is a rare possibility of liver failure that can occur when taking fluconazole.  The patient understands that monitoring of LFTs and kidney function test may be required, especially at baseline. The patient verbalized understanding of the proper use and possible adverse effects of fluconazole.  All of the patient's questions and concerns were addressed. Zyclara Counseling:  I discussed with the patient the risks of imiquimod including but not limited to erythema, scaling, itching, weeping, crusting, and pain.  Patient understands that the inflammatory response to imiquimod is variable from person to person and was educated regarded proper titration schedule.  If flu-like symptoms develop, patient knows to discontinue the medication and contact us. Cibinqo Counseling: I discussed with the patient the risks of Cibinqo therapy including but not limited to common cold, nausea, headache, cold sores, increased blood CPK levels, dizziness, UTIs, fatigue, acne, and vomitting. Live vaccines should be avoided.  This medication has been linked to serious infections; higher rate of mortality; malignancy and lymphoproliferative disorders; major adverse cardiovascular events; thrombosis; thrombocytopenia and lymphopenia; lipid elevations; and retinal detachment. Olumiant Counseling: I discussed with the patient the risks of Olumiant therapy including but not limited to upper respiratory tract infections, shingles, cold sores, and nausea. Live vaccines should be avoided.  This medication has been linked to serious infections; higher rate of mortality; malignancy and lymphoproliferative disorders; major adverse cardiovascular events; thrombosis; gastrointestinal perforations; neutropenia; lymphopenia; anemia; liver enzyme elevations; and lipid elevations. Clindamycin Counseling: I counseled the patient regarding use of clindamycin as an antibiotic for prophylactic and/or therapeutic purposes. Clindamycin is active against numerous classes of bacteria, including skin bacteria. Side effects may include nausea, diarrhea, gastrointestinal upset, rash, hives, yeast infections, and in rare cases, colitis. Minocycline Pregnancy And Lactation Text: This medication is Pregnancy Category D and not consider safe during pregnancy. It is also excreted in breast milk. Tazorac Pregnancy And Lactation Text: This medication is not safe during pregnancy. It is unknown if this medication is excreted in breast milk. Imiquimod Counseling:  I discussed with the patient the risks of imiquimod including but not limited to erythema, scaling, itching, weeping, crusting, and pain.  Patient understands that the inflammatory response to imiquimod is variable from person to person and was educated regarded proper titration schedule.  If flu-like symptoms develop, patient knows to discontinue the medication and contact us. Colchicine Counseling:  Patient counseled regarding adverse effects including but not limited to stomach upset (nausea, vomiting, stomach pain, or diarrhea).  Patient instructed to limit alcohol consumption while taking this medication.  Colchicine may reduce blood counts especially with prolonged use.  The patient understands that monitoring of kidney function and blood counts may be required, especially at baseline. The patient verbalized understanding of the proper use and possible adverse effects of colchicine.  All of the patient's questions and concerns were addressed. Solaraze Counseling:  I discussed with the patient the risks of Solaraze including but not limited to erythema, scaling, itching, weeping, crusting, and pain. Skyrizi Counseling: I discussed with the patient the risks of risankizumab-rzaa including but not limited to immunosuppression, and serious infections.  The patient understands that monitoring is required including a PPD at baseline and must alert us or the primary physician if symptoms of infection or other concerning signs are noted. Hydroxychloroquine Pregnancy And Lactation Text: This medication has been shown to cause fetal harm but it isn't assigned a Pregnancy Risk Category. There are small amounts excreted in breast milk. Aklief Pregnancy And Lactation Text: It is unknown if this medication is safe to use during pregnancy.  It is unknown if this medication is excreted in breast milk.  Breastfeeding women should use the topical cream on the smallest area of the skin for the shortest time needed while breastfeeding.  Do not apply to nipple and areola. Finasteride Pregnancy And Lactation Text: This medication is absolutely contraindicated during pregnancy. It is unknown if it is excreted in breast milk. Cyclophosphamide Counseling:  I discussed with the patient the risks of cyclophosphamide including but not limited to hair loss, hormonal abnormalities, decreased fertility, abdominal pain, diarrhea, nausea and vomiting, bone marrow suppression and infection. The patient understands that monitoring is required while taking this medication. Opzelura Pregnancy And Lactation Text: There is insufficient data to evaluate drug-associated risk for major birth defects, miscarriage, or other adverse maternal or fetal outcomes.  There is a pregnancy registry that monitors pregnancy outcomes in pregnant persons exposed to the medication during pregnancy.  It is unknown if this medication is excreted in breast milk.  Do not breastfeed during treatment and for about 4 weeks after the last dose. Topical Sulfur Applications Counseling: Topical Sulfur Counseling: Patient counseled that this medication may cause skin irritation or allergic reactions.  In the event of skin irritation, the patient was advised to reduce the amount of the drug applied or use it less frequently.   The patient verbalized understanding of the proper use and possible adverse effects of topical sulfur application.  All of the patient's questions and concerns were addressed. 5-Fu Counseling: 5-Fluorouracil Counseling:  I discussed with the patient the risks of 5-fluorouracil including but not limited to erythema, scaling, itching, weeping, crusting, and pain. Cimzia Pregnancy And Lactation Text: This medication crosses the placenta but can be considered safe in certain situations. Cimzia may be excreted in breast milk. Xolair Pregnancy And Lactation Text: This medication is Pregnancy Category B and is considered safe during pregnancy. This medication is excreted in breast milk. Detail Level: Simple Acitretin Counseling:  I discussed with the patient the risks of acitretin including but not limited to hair loss, dry lips/skin/eyes, liver damage, hyperlipidemia, depression/suicidal ideation, photosensitivity.  Serious rare side effects can include but are not limited to pancreatitis, pseudotumor cerebri, bony changes, clot formation/stroke/heart attack.  Patient understands that alcohol is contraindicated since it can result in liver toxicity and significantly prolong the elimination of the drug by many years. Otezla Counseling: The side effects of Otezla were discussed with the patient, including but not limited to worsening or new depression, weight loss, diarrhea, nausea, upper respiratory tract infection, and headache. Patient instructed to call the office should any adverse effect occur.  The patient verbalized understanding of the proper use and possible adverse effects of Otezla.  All the patient's questions and concerns were addressed. Zoryve Pregnancy And Lactation Text: It is unknown if this medication can cause problems during pregnancy and breastfeeding. Cibinqo Pregnancy And Lactation Text: It is unknown if this medication will adversely affect pregnancy or breast feeding.  You should not take this medication if you are currently pregnant or planning a pregnancy or while breastfeeding. Cephalexin Pregnancy And Lactation Text: This medication is Pregnancy Category B and considered safe during pregnancy.  It is also excreted in breast milk but can be used safely for shorter doses. Terbinafine Counseling: Patient counseling regarding adverse effects of terbinafine including but not limited to headache, diarrhea, rash, upset stomach, liver function test abnormalities, itching, taste/smell disturbance, nausea, abdominal pain, and flatulence.  There is a rare possibility of liver failure that can occur when taking terbinafine.  The patient understands that a baseline LFT and kidney function test may be required. The patient verbalized understanding of the proper use and possible adverse effects of terbinafine.  All of the patient's questions and concerns were addressed. Ilumya Counseling: I discussed with the patient the risks of tildrakizumab including but not limited to immunosuppression, malignancy, posterior leukoencephalopathy syndrome, and serious infections.  The patient understands that monitoring is required including a PPD at baseline and must alert us or the primary physician if symptoms of infection or other concerning signs are noted. Albendazole Counseling:  I discussed with the patient the risks of albendazole including but not limited to cytopenia, kidney damage, nausea/vomiting and severe allergy.  The patient understands that this medication is being used in an off-label manner. Rhofade Pregnancy And Lactation Text: This medication has not been assigned a Pregnancy Risk Category. It is unknown if the medication is excreted in breast milk. Tranexamic Acid Counseling:  Patient advised of the small risk of bleeding problems with tranexamic acid. They were also instructed to call if they developed any nausea, vomiting or diarrhea. All of the patient's questions and concerns were addressed. Hydroxychloroquine Counseling:  I discussed with the patient that a baseline ophthalmologic exam is needed at the start of therapy and every year thereafter while on therapy. A CBC may also be warranted for monitoring.  The side effects of this medication were discussed with the patient, including but not limited to agranulocytosis, aplastic anemia, seizures, rashes, retinopathy, and liver toxicity. Patient instructed to call the office should any adverse effect occur.  The patient verbalized understanding of the proper use and possible adverse effects of Plaquenil.  All the patient's questions and concerns were addressed. Cantharidin Pregnancy And Lactation Text: This medication has not been proven safe during pregnancy. It is unknown if this medication is excreted in breast milk. Minocycline Counseling: Patient advised regarding possible photosensitivity and discoloration of the teeth, skin, lips, tongue and gums.  Patient instructed to avoid sunlight, if possible.  When exposed to sunlight, patients should wear protective clothing, sunglasses, and sunscreen.  The patient was instructed to call the office immediately if the following severe adverse effects occur:  hearing changes, easy bruising/bleeding, severe headache, or vision changes.  The patient verbalized understanding of the proper use and possible adverse effects of minocycline.  All of the patient's questions and concerns were addressed. Birth Control Pills Counseling: Birth Control Pill Counseling: I discussed with the patient the potential side effects of OCPs including but not limited to increased risk of stroke, heart attack, thrombophlebitis, deep venous thrombosis, hepatic adenomas, breast changes, GI upset, headaches, and depression.  The patient verbalized understanding of the proper use and possible adverse effects of OCPs. All of the patient's questions and concerns were addressed. Aklief counseling:  Patient advised to apply a pea-sized amount only at bedtime and wait 30 minutes after washing their face before applying.  If too drying, patient may add a non-comedogenic moisturizer.  The most commonly reported side effects including irritation, redness, scaling, dryness, stinging, burning, itching, and increased risk of sunburn.  The patient verbalized understanding of the proper use and possible adverse effects of retinoids.  All of the patient's questions and concerns were addressed. Opzelura Counseling:  I discussed with the patient the risks of Opzelura including but not limited to nasopharngitis, bronchitis, ear infection, eosinophila, hives, diarrhea, folliculitis, tonsillitis, and rhinorrhea.  Taken orally, this medication has been linked to serious infections; higher rate of mortality; malignancy and lymphoproliferative disorders; major adverse cardiovascular events; thrombosis; thrombocytopenia, anemia, and neutropenia; and lipid elevations. Topical Steroids Applications Pregnancy And Lactation Text: Most topical steroids are considered safe to use during pregnancy and lactation.  Any topical steroid applied to the breast or nipple should be washed off before breastfeeding. Cimzia Counseling:  I discussed with the patient the risks of Cimzia including but not limited to immunosuppression, allergic reactions and infections.  The patient understands that monitoring is required including a PPD at baseline and must alert us or the primary physician if symptoms of infection or other concerning signs are noted. Xolair Counseling:  Patient informed of potential adverse effects including but not limited to fever, muscle aches, rash and allergic reactions.  The patient verbalized understanding of the proper use and possible adverse effects of Xolair.  All of the patient's questions and concerns were addressed. Otezla Pregnancy And Lactation Text: This medication is Pregnancy Category C and it isn't known if it is safe during pregnancy. It is unknown if it is excreted in breast milk. Cyclophosphamide Pregnancy And Lactation Text: This medication is Pregnancy Category D and it isn't considered safe during pregnancy. This medication is excreted in breast milk. Zoryve Counseling:  I discussed with the patient that Zoryve is not for use in the eyes, mouth or vagina. The most commonly reported side effects include diarrhea, headache, insomnia, application site pain, upper respiratory tract infections, and urinary tract infections.  All of the patient's questions and concerns were addressed. Cephalexin Counseling: I counseled the patient regarding use of cephalexin as an antibiotic for prophylactic and/or therapeutic purposes. Cephalexin (commonly prescribed under brand name Keflex) is a cephalosporin antibiotic which is active against numerous classes of bacteria, including most skin bacteria. Side effects may include nausea, diarrhea, gastrointestinal upset, rash, hives, yeast infections, and in rare cases, hepatitis, kidney disease, seizures, fever, confusion, neurologic symptoms, and others. Patients with severe allergies to penicillin medications are cautioned that there is about a 10% incidence of cross-reactivity with cephalosporins. When possible, patients with penicillin allergies should use alternatives to cephalosporins for antibiotic therapy. Niacinamide Counseling: I recommended taking niacin or niacinamide, also know as vitamin B3, twice daily. Recent evidence suggests that taking vitamin B3 (500 mg twice daily) can reduce the risk of actinic keratoses and non-melanoma skin cancers. Side effects of vitamin B3 include flushing and headache. Cantharidin Counseling:  I discussed with the patient the risks of Cantharidin including but not limited to pain, redness, burning, itching, and blistering. Ketoconazole Pregnancy And Lactation Text: This medication is Pregnancy Category C and it isn't know if it is safe during pregnancy. It is also excreted in breast milk and breast feeding isn't recommended. Hydroquinone Counseling:  Patient advised that medication may result in skin irritation, lightening (hypopigmentation), dryness, and burning.  In the event of skin irritation, the patient was advised to reduce the amount of the drug applied or use it less frequently.  Rarely, spots that are treated with hydroquinone can become darker (pseudoochronosis).  Should this occur, patient instructed to stop medication and call the office. The patient verbalized understanding of the proper use and possible adverse effects of hydroquinone.  All of the patient's questions and concerns were addressed. Clofazimine Counseling:  I discussed with the patient the risks of clofazimine including but not limited to skin and eye pigmentation, liver damage, nausea/vomiting, gastrointestinal bleeding and allergy. Simponi Counseling:  I discussed with the patient the risks of golimumab including but not limited to myelosuppression, immunosuppression, autoimmune hepatitis, demyelinating diseases, lymphoma, and serious infections.  The patient understands that monitoring is required including a PPD at baseline and must alert us or the primary physician if symptoms of infection or other concerning signs are noted. Xeldoritaz Pregnancy And Lactation Text: This medication is Pregnancy Category D and is not considered safe during pregnancy.  The risk during breast feeding is also uncertain. Rhofade Counseling: Rhofade is a topical medication which can decrease superficial blood flow where applied. Side effects are uncommon and include stinging, redness and allergic reactions. Tranexamic Acid Pregnancy And Lactation Text: It is unknown if this medication is safe during pregnancy or breast feeding. Calcipotriene Pregnancy And Lactation Text: The use of this medication during pregnancy or lactation is not recommended as there is insufficient data. Glycopyrrolate Pregnancy And Lactation Text: This medication is Pregnancy Category B and is considered safe during pregnancy. It is unknown if it is excreted breast milk. Metronidazole Pregnancy And Lactation Text: This medication is Pregnancy Category B and considered safe during pregnancy.  It is also excreted in breast milk. Birth Control Pills Pregnancy And Lactation Text: This medication should be avoided if pregnant and for the first 30 days post-partum. Topical Steroids Counseling: I discussed with the patient that prolonged use of topical steroids can result in the increased appearance of superficial blood vessels (telangiectasias), lightening (hypopigmentation) and thinning of the skin (atrophy).  Patient understands to avoid using high potency steroids in skin folds, the groin or the face.  The patient verbalized understanding of the proper use and possible adverse effects of topical steroids.  All of the patient's questions and concerns were addressed. Tetracycline Counseling: Patient counseled regarding possible photosensitivity and increased risk for sunburn.  Patient instructed to avoid sunlight, if possible.  When exposed to sunlight, patients should wear protective clothing, sunglasses, and sunscreen.  The patient was instructed to call the office immediately if the following severe adverse effects occur:  hearing changes, easy bruising/bleeding, severe headache, or vision changes.  The patient verbalized understanding of the proper use and possible adverse effects of tetracycline.  All of the patient's questions and concerns were addressed. Patient understands to avoid pregnancy while on therapy due to potential birth defects. Adbry Pregnancy And Lactation Text: It is unknown if this medication will adversely affect pregnancy or breast feeding. Hydroxyzine Pregnancy And Lactation Text: This medication is not safe during pregnancy and should not be taken. It is also excreted in breast milk and breast feeding isn't recommended. Oxybutynin Counseling:  I discussed with the patient the risks of oxybutynin including but not limited to skin rash, drowsiness, dry mouth, difficulty urinating, and blurred vision. Cyclosporine Counseling:  I discussed with the patient the risks of cyclosporine including but not limited to hypertension, gingival hyperplasia,myelosuppression, immunosuppression, liver damage, kidney damage, neurotoxicity, lymphoma, and serious infections. The patient understands that monitoring is required including baseline blood pressure, CBC, CMP, lipid panel and uric acid, and then 1-2 times monthly CMP and blood pressure. Calcipotriene Counseling:  I discussed with the patient the risks of calcipotriene including but not limited to erythema, scaling, itching, and irritation. Ketoconazole Counseling:   Patient counseled regarding improving absorption with orange juice.  Adverse effects include but are not limited to breast enlargement, headache, diarrhea, nausea, upset stomach, liver function test abnormalities, taste disturbance, and stomach pain.  There is a rare possibility of liver failure that can occur when taking ketoconazole. The patient understands that monitoring of LFTs may be required, especially at baseline. The patient verbalized understanding of the proper use and possible adverse effects of ketoconazole.  All of the patient's questions and concerns were addressed. Bactrim Pregnancy And Lactation Text: This medication is Pregnancy Category D and is known to cause fetal risk.  It is also excreted in breast milk. Humira Counseling:  I discussed with the patient the risks of adalimumab including but not limited to myelosuppression, immunosuppression, autoimmune hepatitis, demyelinating diseases, lymphoma, and serious infections.  The patient understands that monitoring is required including a PPD at baseline and must alert us or the primary physician if symptoms of infection or other concerning signs are noted. High Dose Vitamin A Pregnancy And Lactation Text: High dose vitamin A therapy is contraindicated during pregnancy and breast feeding. Niacinamide Pregnancy And Lactation Text: These medications are considered safe during pregnancy. Valtrex Counseling: I discussed with the patient the risks of valacyclovir including but not limited to kidney damage, nausea, vomiting and severe allergy.  The patient understands that if the infection seems to be worsening or is not improving, they are to call. Metronidazole Counseling:  I discussed with the patient the risks of metronidazole including but not limited to seizures, nausea/vomiting, a metallic taste in the mouth, nausea/vomiting and severe allergy. Xeljanz Counseling: I discussed with the patient the risks of Xeljanz therapy including increased risk of infection, liver issues, headache, diarrhea, or cold symptoms. Live vaccines should be avoided. They were instructed to call if they have any problems. Qbrexza Pregnancy And Lactation Text: There is no available data on Qbrexza use in pregnant women.  There is no available data on Qbrexza use in lactation. Spironolactone Counseling: Patient advised regarding risks of diarrhea, abdominal pain, hyperkalemia, birth defects (for female patients), liver toxicity and renal toxicity. The patient may need blood work to monitor liver and kidney function and potassium levels while on therapy. The patient verbalized understanding of the proper use and possible adverse effects of spironolactone.  All of the patient's questions and concerns were addressed. Mirvaso Counseling: Mirvaso is a topical medication which can decrease superficial blood flow where applied. Side effects are uncommon and include stinging, redness and allergic reactions. Topical Metronidazole Pregnancy And Lactation Text: This medication is Pregnancy Category B and considered safe during pregnancy.  It is also considered safe to use while breastfeeding. Glycopyrrolate Counseling:  I discussed with the patient the risks of glycopyrrolate including but not limited to skin rash, drowsiness, dry mouth, difficulty urinating, and blurred vision. Adbry Counseling: I discussed with the patient the risks of tralokinumab including but not limited to eye infection and irritation, cold sores, injection site reactions, worsening of asthma, allergic reactions and increased risk of parasitic infection.  Live vaccines should be avoided while taking tralokinumab. The patient understands that monitoring is required and they must alert us or the primary physician if symptoms of infection or other concerning signs are noted. Tremfya Counseling: I discussed with the patient the risks of guselkumab including but not limited to immunosuppression, serious infections, and drug reactions.  The patient understands that monitoring is required including a PPD at baseline and must alert us or the primary physician if symptoms of infection or other concerning signs are noted. Tazorac Counseling:  Patient advised that medication is irritating and drying.  Patient may need to apply sparingly and wash off after an hour before eventually leaving it on overnight.  The patient verbalized understanding of the proper use and possible adverse effects of tazorac.  All of the patient's questions and concerns were addressed. Bactrim Counseling:  I discussed with the patient the risks of sulfa antibiotics including but not limited to GI upset, allergic reaction, drug rash, diarrhea, dizziness, photosensitivity, and yeast infections.  Rarely, more serious reactions can occur including but not limited to aplastic anemia, agranulocytosis, methemoglobinemia, blood dyscrasias, liver or kidney failure, lung infiltrates or desquamative/blistering drug rashes. Hydroxyzine Counseling: Patient advised that the medication is sedating and not to drive a car after taking this medication.  Patient informed of potential adverse effects including but not limited to dry mouth, urinary retention, and blurry vision.  The patient verbalized understanding of the proper use and possible adverse effects of hydroxyzine.  All of the patient's questions and concerns were addressed. VTAMA Counseling: I discussed with the patient that VTAMA is not for use in the eyes, mouth or mouth. They should call the office if they develop any signs of allergic reactions to VTAMA. The patient verbalized understanding of the proper use and possible adverse effects of VTAMA.  All of the patient's questions and concerns were addressed. Nsaids Counseling: NSAID Counseling: I discussed with the patient that NSAIDs should be taken with food. Prolonged use of NSAIDs can result in the development of stomach ulcers.  Patient advised to stop taking NSAIDs if abdominal pain occurs.  The patient verbalized understanding of the proper use and possible adverse effects of NSAIDs.  All of the patient's questions and concerns were addressed. Arava Counseling:  Patient counseled regarding adverse effects of Arava including but not limited to nausea, vomiting, abnormalities in liver function tests. Patients may develop mouth sores, rash, diarrhea, and abnormalities in blood counts. The patient understands that monitoring is required including LFTs and blood counts.  There is a rare possibility of scarring of the liver and lung problems that can occur when taking methotrexate. Persistent nausea, loss of appetite, pale stools, dark urine, cough, and shortness of breath should be reported immediately. Patient advised to discontinue Arava treatment and consult with a physician prior to attempting conception. The patient will have to undergo a treatment to eliminate Arava from the body prior to conception. Sotyktu Pregnancy And Lactation Text: There is insufficient data to evaluate whether or not Sotyktu is safe to use during pregnancy.? ?It is not known if Sotyktu passes into breast milk and whether or not it is safe to use when breastfeeding.?? High Dose Vitamin A Counseling: Side effects reviewed, pt to contact office should one occur. Eucrisa Counseling: Patient may experience a mild burning sensation during topical application. Eucrisa is not approved in children less than 2 years of age. Qbrexza Counseling:  I discussed with the patient the risks of Qbrexza including but not limited to headache, mydriasis, blurred vision, dry eyes, nasal dryness, dry mouth, dry throat, dry skin, urinary hesitation, and constipation.  Local skin reactions including erythema, burning, stinging, and itching can also occur. Erythromycin Pregnancy And Lactation Text: This medication is Pregnancy Category B and is considered safe during pregnancy. It is also excreted in breast milk.

## 2024-07-19 ENCOUNTER — APPOINTMENT (OUTPATIENT)
Dept: ORTHOPEDIC SURGERY | Facility: CLINIC | Age: 69
End: 2024-07-19

## 2024-07-19 DIAGNOSIS — M17.11 UNILATERAL PRIMARY OSTEOARTHRITIS, RIGHT KNEE: ICD-10-CM

## 2024-07-19 PROCEDURE — J3490M: CUSTOM

## 2024-07-19 PROCEDURE — 99214 OFFICE O/P EST MOD 30 MIN: CPT | Mod: 25

## 2024-07-19 PROCEDURE — 20610 DRAIN/INJ JOINT/BURSA W/O US: CPT | Mod: RT

## 2024-08-16 ENCOUNTER — APPOINTMENT (OUTPATIENT)
Dept: ORTHOPEDIC SURGERY | Facility: CLINIC | Age: 69
End: 2024-08-16
Payer: COMMERCIAL

## 2024-08-16 DIAGNOSIS — M17.11 UNILATERAL PRIMARY OSTEOARTHRITIS, RIGHT KNEE: ICD-10-CM

## 2024-08-16 PROCEDURE — 20610 DRAIN/INJ JOINT/BURSA W/O US: CPT | Mod: RT

## 2024-08-16 PROCEDURE — 99214 OFFICE O/P EST MOD 30 MIN: CPT | Mod: 25

## 2024-08-16 NOTE — ASSESSMENT
[FreeTextEntry1] : 68 year F WITH ADVANCED RIGHT KNEE OA. SHE HAS TRIED MEDROL AND CYCLOBENZAPRINE FOR PAIN RELIEF. HAD CSI 9/1/23 WITH SOME RELIEF. PAIN WORSENS WITH STAIRS AND WALKING LONG DISTANCES. PAIN IS AFFECTING ADL AND FUNCTIONAL ACTIVITIES. TREATMENT OPTIONS REVIEWED. DISCUSSED R&B OF EVENTUAL TKA.  RT KNEE ORTHOVISC #1 TODAY. PATIENT TOLERATED INJECTION WELL. Opioid Counseling: I discussed with the patient the potential side effects of opioids including but not limited to addiction, altered mental status, and depression. I stressed avoiding alcohol, benzodiazepines, muscle relaxants and sleep aids unless specifically okayed by a physician. The patient verbalized understanding of the proper use and possible adverse effects of opioids. All of the patient's questions and concerns were addressed. They were instructed to flush the remaining pills down the toilet if they did not need them for pain.

## 2024-08-16 NOTE — HISTORY OF PRESENT ILLNESS
[Gradual] : gradual [4] : 4 [3] : 3 [Throbbing] : throbbing [Intermittent] : intermittent [Rest] : rest [Injection therapy] : injection therapy [Walking] : walking [2] : 2 [Orthovisc] : Orthovisc [de-identified] : 09/16/2022 right knee orthovisc injections # 3  11/18/22 pt stated she has made no improvement with pain nor range of motion   03/28/2023 here for a follow up on the right knee finished series of orthovisc injections in september 20222 with good relief   04/04/23 right knee orthovisc # 2   04/11/23 right knee orthovisc # 3   04/18/23 right knee orthovisc # 4   09/01/23 here for a follow up right knee finished series of orthovisc 04/18/23 with some relief   12/19/23 follow up right knee  7.19.24  8.16.24 here for right knee pain. orthovisc #1 [] : no [FreeTextEntry1] : right knee [de-identified] : orthovisc injections  [de-identified] : right knee [de-identified] : orthovisc

## 2024-08-16 NOTE — IMAGING
[de-identified] : Right Hip/Thigh: Inspection of the hip/thigh is as follows: Inspection shows no swelling. Range of motion of the hip\par  is as follows: limited internal rotation and limited external rotation. \par  \par  Left Hip/Thigh: Inspection of the hip/thigh is as follows: Inspection shows no swelling. Range of motion of the hip is\par  as follows: limited internal rotation and limited external rotation. \par  \par  Right Knee: Inspection of the knee is as follows: no effusion, erythema, ecchymosis, scars or deformities. Palpation\par  of the knee is as follows: medial joint line tenderness, lateral joint line tenderness, medial facet of patella\par  tenderness, lateral facet of patella tenderness, patellar compression tenderness and crepitus about the\par  patella. Knee Range of Motion is as follows: full flexion and extension without pain (0-140). Strength examination of the\par  knee is as follows: Quadriceps strength is 5/5 Hamstring strength is 5/5 Ligament Stability and Special\par  Test ligamentously stable. Neurological examination of the knee is as follows: light touch is intact throughout. \par  \par  Left Knee: Inspection of the knee is as follows: no effusion, erythema, ecchymosis, scars or deformities. Palpation of\par  the knee is as follows: medial joint line tenderness, lateral joint line tenderness, medial facet of patella\par  tenderness, lateral facet of patella tenderness, patellar compression tenderness and crepitus about the\par  patella. Knee Range of Motion is as follows: full flexion and extension without pain (0-140). Strength examination of the\par  knee is as follows: Quadriceps strength is 5/5 Hamstring strength is 5/5 Ligament Stability and Special\par  Test ligamentously stable. Neurological examination of the knee is as follows: light touch is intact throughout.

## 2024-08-16 NOTE — PROCEDURE
[de-identified] : Procedure Name: Orthovisc (Large Joint)  Viscosupplementation Injection: X-ray evidence of Osteoarthritis on this or prior visit, Patient has tried OTC's including aspirin, Ibuprofen, Aleve etc or prescription NSAIDS, and/or exercises at home and/ or physical therapy without satisfactory response and Repeat series performed because patient had significant improvement in their pain and functional capacity from prior series which was given more than six months ago.   An injection of Orthovisc 2ml #1 was injected into the right knee(s). The risks, benefits, and alternatives to Viscosupplementation injection were explained in full to the patient. Risks outlined include but are not limited to infection, sepsis, bleeding, scarring, skin discoloration, temporary increase in pain, syncopal episode, failure to resolve symptoms, allergic reaction, and symptom recurrence. Signs and symptoms of infection reviewed and patient advised to call immediately for redness, fevers, and/or chills. Patient understood the risks. All questions were answered. After discussion of options, patient requested Viscosupplementation. Oral informed consent was obtained and sterile prep was done of the injection site. The patient tolerated the procedure well. Ice tonight to the injection site.

## 2024-08-26 ENCOUNTER — APPOINTMENT (OUTPATIENT)
Dept: ORTHOPEDIC SURGERY | Facility: CLINIC | Age: 69
End: 2024-08-26
Payer: COMMERCIAL

## 2024-08-26 PROCEDURE — J3490M: CUSTOM

## 2024-08-26 PROCEDURE — 20610 DRAIN/INJ JOINT/BURSA W/O US: CPT | Mod: RT

## 2024-08-26 NOTE — IMAGING
[de-identified] : Right Hip/Thigh: Inspection of the hip/thigh is as follows: Inspection shows no swelling. Range of motion of the hip\par  is as follows: limited internal rotation and limited external rotation. \par  \par  Left Hip/Thigh: Inspection of the hip/thigh is as follows: Inspection shows no swelling. Range of motion of the hip is\par  as follows: limited internal rotation and limited external rotation. \par  \par  Right Knee: Inspection of the knee is as follows: no effusion, erythema, ecchymosis, scars or deformities. Palpation\par  of the knee is as follows: medial joint line tenderness, lateral joint line tenderness, medial facet of patella\par  tenderness, lateral facet of patella tenderness, patellar compression tenderness and crepitus about the\par  patella. Knee Range of Motion is as follows: full flexion and extension without pain (0-140). Strength examination of the\par  knee is as follows: Quadriceps strength is 5/5 Hamstring strength is 5/5 Ligament Stability and Special\par  Test ligamentously stable. Neurological examination of the knee is as follows: light touch is intact throughout. \par  \par  Left Knee: Inspection of the knee is as follows: no effusion, erythema, ecchymosis, scars or deformities. Palpation of\par  the knee is as follows: medial joint line tenderness, lateral joint line tenderness, medial facet of patella\par  tenderness, lateral facet of patella tenderness, patellar compression tenderness and crepitus about the\par  patella. Knee Range of Motion is as follows: full flexion and extension without pain (0-140). Strength examination of the\par  knee is as follows: Quadriceps strength is 5/5 Hamstring strength is 5/5 Ligament Stability and Special\par  Test ligamentously stable. Neurological examination of the knee is as follows: light touch is intact throughout.

## 2024-08-26 NOTE — HISTORY OF PRESENT ILLNESS
[de-identified] : 08/26/2024: Patient had orthovis #1 on 08/16/2024 on the right knee. Patient here for Orthovis #2 today. Patient states that the right knee has been feeling better with the injection.

## 2024-08-26 NOTE — PROCEDURE
[de-identified] : Procedure Name: Orthovisc (Large Joint)  Viscosupplementation Injection: X-ray evidence of Osteoarthritis on this or prior visit, Patient has tried OTC's including aspirin, Ibuprofen, Aleve etc or prescription NSAIDS, and/or exercises at home and/ or physical therapy without satisfactory response and Repeat series performed because patient had significant improvement in their pain and functional capacity from prior series which was given more than six months ago.   An injection of Orthovisc 2ml #2 was injected into the right knee(s). The risks, benefits, and alternatives to Viscosupplementation injection were explained in full to the patient. Risks outlined include but are not limited to infection, sepsis, bleeding, scarring, skin discoloration, temporary increase in pain, syncopal episode, failure to resolve symptoms, allergic reaction, and symptom recurrence. Signs and symptoms of infection reviewed and patient advised to call immediately for redness, fevers, and/or chills. Patient understood the risks. All questions were answered. After discussion of options, patient requested Viscosupplementation. Oral informed consent was obtained and sterile prep was done of the injection site. The patient tolerated the procedure well. Ice tonight to the injection site.

## 2024-08-26 NOTE — ASSESSMENT
[FreeTextEntry1] : 68 year F WITH ADVANCED RIGHT KNEE OA. SHE HAS TRIED MEDROL AND CYCLOBENZAPRINE FOR PAIN RELIEF. HAD CSI 9/1/23 WITH SOME RELIEF. PAIN WORSENS WITH STAIRS AND WALKING LONG DISTANCES. PAIN IS AFFECTING ADL AND FUNCTIONAL ACTIVITIES. TREATMENT OPTIONS REVIEWED. DISCUSSED R&B OF EVENTUAL TKA.  RT KNEE ORTHOVISC #2 TODAY. PATIENT TOLERATED INJECTION WELL.

## 2024-09-03 ENCOUNTER — APPOINTMENT (OUTPATIENT)
Dept: ORTHOPEDIC SURGERY | Facility: CLINIC | Age: 69
End: 2024-09-03
Payer: COMMERCIAL

## 2024-09-03 PROCEDURE — 99213 OFFICE O/P EST LOW 20 MIN: CPT | Mod: 25

## 2024-09-03 PROCEDURE — 20610 DRAIN/INJ JOINT/BURSA W/O US: CPT | Mod: RT

## 2024-09-03 NOTE — HISTORY OF PRESENT ILLNESS
[Gradual] : gradual [4] : 4 [3] : 3 [Throbbing] : throbbing [Intermittent] : intermittent [Rest] : rest [Injection therapy] : injection therapy [Walking] : walking [2] : 2 [Orthovisc] : Orthovisc [de-identified] : 09/16/2022 right knee orthovisc injections # 3  11/18/22 pt stated she has made no improvement with pain nor range of motion   03/28/2023 here for a follow up on the right knee finished series of orthovisc injections in september 20222 with good relief   04/04/23 right knee orthovisc # 2   04/11/23 right knee orthovisc # 3   04/18/23 right knee orthovisc # 4   09/01/23 here for a follow up right knee finished series of orthovisc 04/18/23 with some relief   12/19/23 follow up right knee  7.19.24  8.16.24 here for right knee pain. orthovisc #1  08/03/2024: Patient here for orthovisc #3. Patient had the 2nd injection with Neeru on 08/26/2024. Patient states that she was in lots of pain and discomfort yesterday. However, she took Aliev which helped her. Aside from yesterday the right knee has been feeling great.  [] : no [FreeTextEntry1] : right knee [de-identified] : orthovisc injections  [de-identified] : right knee [de-identified] : orthovisc

## 2024-09-03 NOTE — PROCEDURE
[de-identified] : Procedure Name: Orthovisc (Large Joint)  Viscosupplementation Injection: X-ray evidence of Osteoarthritis on this or prior visit, Patient has tried OTC's including aspirin, Ibuprofen, Aleve etc or prescription NSAIDS, and/or exercises at home and/ or physical therapy without satisfactory response and Repeat series performed because patient had significant improvement in their pain and functional capacity from prior series which was given more than six months ago.   An injection of Orthovisc 2ml #3 was injected into the right knee(s). The risks, benefits, and alternatives to Viscosupplementation injection were explained in full to the patient. Risks outlined include but are not limited to infection, sepsis, bleeding, scarring, skin discoloration, temporary increase in pain, syncopal episode, failure to resolve symptoms, allergic reaction, and symptom recurrence. Signs and symptoms of infection reviewed and patient advised to call immediately for redness, fevers, and/or chills. Patient understood the risks. All questions were answered. After discussion of options, patient requested Viscosupplementation. Oral informed consent was obtained and sterile prep was done of the injection site. The patient tolerated the procedure well. Ice tonight to the injection site.

## 2024-09-03 NOTE — ASSESSMENT
[FreeTextEntry1] : 68 year F WITH ADVANCED RIGHT KNEE OA. SHE HAS TRIED MEDROL AND CYCLOBENZAPRINE FOR PAIN RELIEF. HAD CSI 9/1/23 WITH SOME RELIEF. PAIN WORSENS WITH STAIRS AND WALKING LONG DISTANCES. PAIN IS AFFECTING ADL AND FUNCTIONAL ACTIVITIES. TREATMENT OPTIONS REVIEWED. DISCUSSED R&B OF EVENTUAL TKA.  RT KNEE ORTHOVISC #3 TODAY. PATIENT TOLERATED INJECTION WELL.

## 2024-09-03 NOTE — IMAGING
[de-identified] : Right Hip/Thigh: Inspection of the hip/thigh is as follows: Inspection shows no swelling. Range of motion of the hip\par  is as follows: limited internal rotation and limited external rotation. \par  \par  Left Hip/Thigh: Inspection of the hip/thigh is as follows: Inspection shows no swelling. Range of motion of the hip is\par  as follows: limited internal rotation and limited external rotation. \par  \par  Right Knee: Inspection of the knee is as follows: no effusion, erythema, ecchymosis, scars or deformities. Palpation\par  of the knee is as follows: medial joint line tenderness, lateral joint line tenderness, medial facet of patella\par  tenderness, lateral facet of patella tenderness, patellar compression tenderness and crepitus about the\par  patella. Knee Range of Motion is as follows: full flexion and extension without pain (0-140). Strength examination of the\par  knee is as follows: Quadriceps strength is 5/5 Hamstring strength is 5/5 Ligament Stability and Special\par  Test ligamentously stable. Neurological examination of the knee is as follows: light touch is intact throughout. \par  \par  Left Knee: Inspection of the knee is as follows: no effusion, erythema, ecchymosis, scars or deformities. Palpation of\par  the knee is as follows: medial joint line tenderness, lateral joint line tenderness, medial facet of patella\par  tenderness, lateral facet of patella tenderness, patellar compression tenderness and crepitus about the\par  patella. Knee Range of Motion is as follows: full flexion and extension without pain (0-140). Strength examination of the\par  knee is as follows: Quadriceps strength is 5/5 Hamstring strength is 5/5 Ligament Stability and Special\par  Test ligamentously stable. Neurological examination of the knee is as follows: light touch is intact throughout.

## 2024-09-10 ENCOUNTER — APPOINTMENT (OUTPATIENT)
Dept: ORTHOPEDIC SURGERY | Facility: CLINIC | Age: 69
End: 2024-09-10
Payer: COMMERCIAL

## 2024-09-10 DIAGNOSIS — M17.11 UNILATERAL PRIMARY OSTEOARTHRITIS, RIGHT KNEE: ICD-10-CM

## 2024-09-10 PROCEDURE — 20610 DRAIN/INJ JOINT/BURSA W/O US: CPT | Mod: RT

## 2024-09-10 PROCEDURE — 99213 OFFICE O/P EST LOW 20 MIN: CPT | Mod: 25

## 2024-09-10 NOTE — IMAGING
[de-identified] : Right Hip/Thigh: Inspection of the hip/thigh is as follows: Inspection shows no swelling. Range of motion of the hip\par  is as follows: limited internal rotation and limited external rotation. \par  \par  Left Hip/Thigh: Inspection of the hip/thigh is as follows: Inspection shows no swelling. Range of motion of the hip is\par  as follows: limited internal rotation and limited external rotation. \par  \par  Right Knee: Inspection of the knee is as follows: no effusion, erythema, ecchymosis, scars or deformities. Palpation\par  of the knee is as follows: medial joint line tenderness, lateral joint line tenderness, medial facet of patella\par  tenderness, lateral facet of patella tenderness, patellar compression tenderness and crepitus about the\par  patella. Knee Range of Motion is as follows: full flexion and extension without pain (0-140). Strength examination of the\par  knee is as follows: Quadriceps strength is 5/5 Hamstring strength is 5/5 Ligament Stability and Special\par  Test ligamentously stable. Neurological examination of the knee is as follows: light touch is intact throughout. \par  \par  Left Knee: Inspection of the knee is as follows: no effusion, erythema, ecchymosis, scars or deformities. Palpation of\par  the knee is as follows: medial joint line tenderness, lateral joint line tenderness, medial facet of patella\par  tenderness, lateral facet of patella tenderness, patellar compression tenderness and crepitus about the\par  patella. Knee Range of Motion is as follows: full flexion and extension without pain (0-140). Strength examination of the\par  knee is as follows: Quadriceps strength is 5/5 Hamstring strength is 5/5 Ligament Stability and Special\par  Test ligamentously stable. Neurological examination of the knee is as follows: light touch is intact throughout.

## 2024-09-10 NOTE — HISTORY OF PRESENT ILLNESS
[Gradual] : gradual [4] : 4 [3] : 3 [Throbbing] : throbbing [Intermittent] : intermittent [Rest] : rest [Injection therapy] : injection therapy [Walking] : walking [2] : 2 [Orthovisc] : Orthovisc [de-identified] : 09/16/2022 right knee orthovisc injections # 3  11/18/22 pt stated she has made no improvement with pain nor range of motion   03/28/2023 here for a follow up on the right knee finished series of orthovisc injections in september 20222 with good relief   04/04/23 right knee orthovisc # 2   04/11/23 right knee orthovisc # 3   04/18/23 right knee orthovisc # 4   09/01/23 here for a follow up right knee finished series of orthovisc 04/18/23 with some relief   12/19/23 follow up right knee  7.19.24  8.16.24 here for right knee pain. orthovisc #1  08/03/2024: Patient here for orthovisc #3. Patient had the 2nd injection with Neeru on 08/26/2024. Patient states that she was in lots of pain and discomfort yesterday. However, she took Aliev which helped her. Aside from yesterday the right knee has been feeling great.   9.10.24 PATIENT HERE FOR RIGHT KNEE PAIN. ORTHOVISC #4.  [] : no [FreeTextEntry1] : right knee [de-identified] : orthovisc injections  [de-identified] : right knee [de-identified] : orthovisc

## 2024-09-10 NOTE — PROCEDURE
[de-identified] : Procedure Name: Orthovisc (Large Joint)  Viscosupplementation Injection: X-ray evidence of Osteoarthritis on this or prior visit, Patient has tried OTC's including aspirin, Ibuprofen, Aleve etc or prescription NSAIDS, and/or exercises at home and/ or physical therapy without satisfactory response and Repeat series performed because patient had significant improvement in their pain and functional capacity from prior series which was given more than six months ago.   An injection of Orthovisc 2ml #4 was injected into the right knee(s). The risks, benefits, and alternatives to Viscosupplementation injection were explained in full to the patient. Risks outlined include but are not limited to infection, sepsis, bleeding, scarring, skin discoloration, temporary increase in pain, syncopal episode, failure to resolve symptoms, allergic reaction, and symptom recurrence. Signs and symptoms of infection reviewed and patient advised to call immediately for redness, fevers, and/or chills. Patient understood the risks. All questions were answered. After discussion of options, patient requested Viscosupplementation. Oral informed consent was obtained and sterile prep was done of the injection site. The patient tolerated the procedure well. Ice tonight to the injection site.

## 2024-09-10 NOTE — ASSESSMENT
[FreeTextEntry1] : 68 year F WITH ADVANCED RIGHT KNEE OA. SHE HAS TRIED MEDROL AND CYCLOBENZAPRINE FOR PAIN RELIEF. HAD CSI 9/1/23 WITH SOME RELIEF. PAIN WORSENS WITH STAIRS AND WALKING LONG DISTANCES. PAIN IS AFFECTING ADL AND FUNCTIONAL ACTIVITIES. TREATMENT OPTIONS REVIEWED. DISCUSSED R&B OF EVENTUAL TKA.  RT KNEE ORTHOVISC #4 TODAY. PATIENT TOLERATED INJECTION WELL.

## 2025-02-28 ENCOUNTER — APPOINTMENT (OUTPATIENT)
Dept: ORTHOPEDIC SURGERY | Facility: CLINIC | Age: 70
End: 2025-02-28
Payer: COMMERCIAL

## 2025-02-28 DIAGNOSIS — M17.11 UNILATERAL PRIMARY OSTEOARTHRITIS, RIGHT KNEE: ICD-10-CM

## 2025-02-28 PROCEDURE — 20610 DRAIN/INJ JOINT/BURSA W/O US: CPT | Mod: RT

## 2025-02-28 PROCEDURE — 99214 OFFICE O/P EST MOD 30 MIN: CPT | Mod: 25

## 2025-02-28 PROCEDURE — J3490M: CUSTOM

## 2025-02-28 RX ORDER — SEMAGLUTIDE 1 MG/.5ML
1 INJECTION, SOLUTION SUBCUTANEOUS
Refills: 0 | Status: ACTIVE | COMMUNITY

## 2025-03-05 ENCOUNTER — EMERGENCY (EMERGENCY)
Facility: HOSPITAL | Age: 70
LOS: 1 days | Discharge: HOME | End: 2025-03-05
Attending: STUDENT IN AN ORGANIZED HEALTH CARE EDUCATION/TRAINING PROGRAM | Admitting: EMERGENCY MEDICINE
Payer: COMMERCIAL

## 2025-03-05 VITALS
SYSTOLIC BLOOD PRESSURE: 143 MMHG | HEIGHT: 67 IN | OXYGEN SATURATION: 99 % | DIASTOLIC BLOOD PRESSURE: 79 MMHG | RESPIRATION RATE: 16 BRPM | HEART RATE: 85 BPM | TEMPERATURE: 99 F | WEIGHT: 231.93 LBS

## 2025-03-05 DIAGNOSIS — Z90.49 ACQUIRED ABSENCE OF OTHER SPECIFIED PARTS OF DIGESTIVE TRACT: Chronic | ICD-10-CM

## 2025-03-05 DIAGNOSIS — Z98.890 OTHER SPECIFIED POSTPROCEDURAL STATES: Chronic | ICD-10-CM

## 2025-03-05 PROCEDURE — 99285 EMERGENCY DEPT VISIT HI MDM: CPT

## 2025-03-06 VITALS
OXYGEN SATURATION: 96 % | TEMPERATURE: 98 F | DIASTOLIC BLOOD PRESSURE: 71 MMHG | SYSTOLIC BLOOD PRESSURE: 136 MMHG | RESPIRATION RATE: 18 BRPM | HEART RATE: 73 BPM

## 2025-03-06 LAB
ALBUMIN SERPL ELPH-MCNC: 3.4 G/DL — SIGNIFICANT CHANGE UP (ref 3.3–5)
ALP SERPL-CCNC: 63 U/L — SIGNIFICANT CHANGE UP (ref 40–120)
ALT FLD-CCNC: 15 U/L — SIGNIFICANT CHANGE UP (ref 12–78)
ANION GAP SERPL CALC-SCNC: 6 MMOL/L — SIGNIFICANT CHANGE UP (ref 5–17)
APTT BLD: 30.3 SEC — SIGNIFICANT CHANGE UP (ref 24.5–35.6)
AST SERPL-CCNC: 10 U/L — LOW (ref 15–37)
BASOPHILS # BLD AUTO: 0.05 K/UL — SIGNIFICANT CHANGE UP (ref 0–0.2)
BASOPHILS NFR BLD AUTO: 0.6 % — SIGNIFICANT CHANGE UP (ref 0–2)
BILIRUB SERPL-MCNC: 0.4 MG/DL — SIGNIFICANT CHANGE UP (ref 0.2–1.2)
BUN SERPL-MCNC: 13 MG/DL — SIGNIFICANT CHANGE UP (ref 7–23)
CALCIUM SERPL-MCNC: 9.1 MG/DL — SIGNIFICANT CHANGE UP (ref 8.5–10.1)
CHLORIDE SERPL-SCNC: 106 MMOL/L — SIGNIFICANT CHANGE UP (ref 96–108)
CO2 SERPL-SCNC: 28 MMOL/L — SIGNIFICANT CHANGE UP (ref 22–31)
CREAT SERPL-MCNC: 0.82 MG/DL — SIGNIFICANT CHANGE UP (ref 0.5–1.3)
EGFR: 77 ML/MIN/1.73M2 — SIGNIFICANT CHANGE UP
EGFR: 77 ML/MIN/1.73M2 — SIGNIFICANT CHANGE UP
EOSINOPHIL # BLD AUTO: 0.29 K/UL — SIGNIFICANT CHANGE UP (ref 0–0.5)
EOSINOPHIL NFR BLD AUTO: 3.3 % — SIGNIFICANT CHANGE UP (ref 0–6)
GLUCOSE SERPL-MCNC: 96 MG/DL — SIGNIFICANT CHANGE UP (ref 70–99)
HCT VFR BLD CALC: 36.6 % — SIGNIFICANT CHANGE UP (ref 34.5–45)
HGB BLD-MCNC: 12.1 G/DL — SIGNIFICANT CHANGE UP (ref 11.5–15.5)
IMM GRANULOCYTES NFR BLD AUTO: 0.2 % — SIGNIFICANT CHANGE UP (ref 0–0.9)
INR BLD: 1.16 RATIO — SIGNIFICANT CHANGE UP (ref 0.85–1.16)
LYMPHOCYTES # BLD AUTO: 2.56 K/UL — SIGNIFICANT CHANGE UP (ref 1–3.3)
LYMPHOCYTES # BLD AUTO: 28.8 % — SIGNIFICANT CHANGE UP (ref 13–44)
MCHC RBC-ENTMCNC: 28.5 PG — SIGNIFICANT CHANGE UP (ref 27–34)
MCHC RBC-ENTMCNC: 33.1 G/DL — SIGNIFICANT CHANGE UP (ref 32–36)
MCV RBC AUTO: 86.1 FL — SIGNIFICANT CHANGE UP (ref 80–100)
MONOCYTES # BLD AUTO: 0.87 K/UL — SIGNIFICANT CHANGE UP (ref 0–0.9)
MONOCYTES NFR BLD AUTO: 9.8 % — SIGNIFICANT CHANGE UP (ref 2–14)
NEUTROPHILS # BLD AUTO: 5.09 K/UL — SIGNIFICANT CHANGE UP (ref 1.8–7.4)
NEUTROPHILS NFR BLD AUTO: 57.3 % — SIGNIFICANT CHANGE UP (ref 43–77)
NRBC BLD AUTO-RTO: 0 /100 WBCS — SIGNIFICANT CHANGE UP (ref 0–0)
PLATELET # BLD AUTO: 217 K/UL — SIGNIFICANT CHANGE UP (ref 150–400)
POTASSIUM SERPL-MCNC: 4.2 MMOL/L — SIGNIFICANT CHANGE UP (ref 3.5–5.3)
POTASSIUM SERPL-SCNC: 4.2 MMOL/L — SIGNIFICANT CHANGE UP (ref 3.5–5.3)
PROT SERPL-MCNC: 7.2 G/DL — SIGNIFICANT CHANGE UP (ref 6–8.3)
PROTHROM AB SERPL-ACNC: 13.6 SEC — HIGH (ref 9.9–13.4)
RBC # BLD: 4.25 M/UL — SIGNIFICANT CHANGE UP (ref 3.8–5.2)
RBC # FLD: 12.2 % — SIGNIFICANT CHANGE UP (ref 10.3–14.5)
SODIUM SERPL-SCNC: 140 MMOL/L — SIGNIFICANT CHANGE UP (ref 135–145)
WBC # BLD: 8.88 K/UL — SIGNIFICANT CHANGE UP (ref 3.8–10.5)
WBC # FLD AUTO: 8.88 K/UL — SIGNIFICANT CHANGE UP (ref 3.8–10.5)

## 2025-03-06 PROCEDURE — 93971 EXTREMITY STUDY: CPT

## 2025-03-06 PROCEDURE — 99284 EMERGENCY DEPT VISIT MOD MDM: CPT | Mod: 25

## 2025-03-06 PROCEDURE — 85025 COMPLETE CBC W/AUTO DIFF WBC: CPT

## 2025-03-06 PROCEDURE — 93971 EXTREMITY STUDY: CPT | Mod: 26,RT

## 2025-03-06 PROCEDURE — 85610 PROTHROMBIN TIME: CPT

## 2025-03-06 PROCEDURE — 36415 COLL VENOUS BLD VENIPUNCTURE: CPT

## 2025-03-06 PROCEDURE — 80053 COMPREHEN METABOLIC PANEL: CPT

## 2025-03-06 PROCEDURE — 85730 THROMBOPLASTIN TIME PARTIAL: CPT

## 2025-03-06 RX ORDER — RIVAROXABAN 10 MG/1
1 TABLET, FILM COATED ORAL
Qty: 1 | Refills: 0
Start: 2025-03-06 | End: 2025-04-02

## 2025-03-06 RX ORDER — RIVAROXABAN 10 MG/1
15 TABLET, FILM COATED ORAL ONCE
Refills: 0 | Status: COMPLETED | OUTPATIENT
Start: 2025-03-06 | End: 2025-03-06

## 2025-03-06 RX ADMIN — RIVAROXABAN 15 MILLIGRAM(S): 10 TABLET, FILM COATED ORAL at 01:11

## 2025-03-17 ENCOUNTER — APPOINTMENT (OUTPATIENT)
Dept: VASCULAR SURGERY | Facility: CLINIC | Age: 70
End: 2025-03-17
Payer: COMMERCIAL

## 2025-03-17 VITALS
TEMPERATURE: 98.4 F | OXYGEN SATURATION: 98 % | WEIGHT: 217 LBS | SYSTOLIC BLOOD PRESSURE: 137 MMHG | HEIGHT: 65 IN | RESPIRATION RATE: 14 BRPM | DIASTOLIC BLOOD PRESSURE: 78 MMHG | BODY MASS INDEX: 36.15 KG/M2 | HEART RATE: 75 BPM

## 2025-03-17 DIAGNOSIS — I82.409 ACUTE EMBOLISM AND THROMBOSIS OF UNSPECIFIED DEEP VEINS OF UNSPECIFIED LOWER EXTREMITY: ICD-10-CM

## 2025-03-17 PROCEDURE — 99204 OFFICE O/P NEW MOD 45 MIN: CPT

## 2025-03-17 PROCEDURE — 93970 EXTREMITY STUDY: CPT

## 2025-03-17 RX ORDER — RIVAROXABAN 15 MG-20MG
15 & 20 KIT ORAL
Refills: 0 | Status: ACTIVE | COMMUNITY

## 2025-03-24 ENCOUNTER — OUTPATIENT (OUTPATIENT)
Dept: OUTPATIENT SERVICES | Facility: HOSPITAL | Age: 70
LOS: 1 days | Discharge: HOME | End: 2025-03-24

## 2025-03-24 DIAGNOSIS — Z98.890 OTHER SPECIFIED POSTPROCEDURAL STATES: Chronic | ICD-10-CM

## 2025-03-24 DIAGNOSIS — I82.411 ACUTE EMBOLISM AND THROMBOSIS OF RIGHT FEMORAL VEIN: ICD-10-CM

## 2025-03-24 DIAGNOSIS — Z90.49 ACQUIRED ABSENCE OF OTHER SPECIFIED PARTS OF DIGESTIVE TRACT: Chronic | ICD-10-CM

## 2025-04-21 ENCOUNTER — APPOINTMENT (OUTPATIENT)
Dept: HEMATOLOGY ONCOLOGY | Facility: CLINIC | Age: 70
End: 2025-04-21
Payer: COMMERCIAL

## 2025-04-21 DIAGNOSIS — I82.409 ACUTE EMBOLISM AND THROMBOSIS OF UNSPECIFIED DEEP VEINS OF UNSPECIFIED LOWER EXTREMITY: ICD-10-CM

## 2025-04-21 PROCEDURE — 99205 OFFICE O/P NEW HI 60 MIN: CPT

## 2025-04-21 RX ORDER — RIVAROXABAN 20 MG/1
20 TABLET, FILM COATED ORAL
Qty: 30 | Refills: 6 | Status: ACTIVE | COMMUNITY
Start: 2025-04-21 | End: 1900-01-01

## 2025-04-22 LAB
B2 GLYCOPROT1 IGG SER-ACNC: <1.4 U/ML
B2 GLYCOPROT1 IGM SER-ACNC: 4.1 U/ML
CARDIOLIPIN IGM SER-MCNC: 6.1 MPL U/ML
CARDIOLIPIN IGM SER-MCNC: <1.6 GPL U/ML
DEPRECATED D DIMER PPP IA-ACNC: 221 NG/ML DDU

## 2025-04-23 LAB
AT III PPP CHRO-ACNC: 96 %
PROT C PPP CHRO-ACNC: 100 %
PROT S AG ACT/NOR PPP IA: 89 %

## 2025-04-24 LAB — PTR INTERP: NORMAL

## 2025-04-25 ENCOUNTER — NON-APPOINTMENT (OUTPATIENT)
Age: 70
End: 2025-04-25

## 2025-04-25 LAB — DNA PLOIDY SPEC FC-IMP: NORMAL

## 2025-06-26 NOTE — H&P PST ADULT - MUSCULOSKELETAL
What to expect after your surgery     Your polyp(s) were removed during a procedure much like the one you had for diagnosis.  Cystoscopy is performed and the Urologist will excise the polyp (remove it).    It is then sent to the lab for pathology.  Dr. Mendoza will discuss any results with you at your follow up visit.     You may have a small tube called a catheter in your urethra to help prevent blockage of the urethra. When the bleeding from surgery has stopped, the tube is removed. You will go home the same day.     You may feel the need to urinate frequently for a while after the surgery, but this should improve with time.   It may burn when you urinate. Drink lots of fluids to help with the burning. If this is bothersome, please let your doctor know.  There are medications that can help.  Your urine also may look pink, red, or dark for up to 2 weeks after surgery. This is because there may be blood in it.  This is normal.  Hydrate well.       Activity  Rest when you feel tired. Getting enough sleep will help you recover.  Try to walk each day. Walking boosts blood flow and helps prevent pneumonia and constipation.  You can drive as soon as you are recovered from anesthesia- about 24-48 hours.  Do not drive on narcotic pain medications.  You may shower in 24 hours.     Diet  You can eat your normal diet. If your stomach is upset, try bland, low-fat foods like plain rice, broiled chicken, toast, and yogurt.  Drink plenty of fluids (unless your doctor tells you not to).      TO PREVENT AN INFECTION  WASH YOUR HANDS  To prevent infection, good handwashing is the most important thing you or your caregiver can do.  Wash your hands with soap and water or use the hand  we gave you before you touch any wounds.      2. SHOWER  Use the antibacterial soap we gave you when your surgeon says it is okay to take a shower.  Shower with this soap until your wounds are healed.  To reach all areas of your body, you may need  details… detailed exam left shoulder/decreased ROM due to pain

## 2025-07-01 ENCOUNTER — APPOINTMENT (OUTPATIENT)
Dept: ULTRASOUND IMAGING | Facility: CLINIC | Age: 70
End: 2025-07-01
Payer: COMMERCIAL

## 2025-07-01 ENCOUNTER — OUTPATIENT (OUTPATIENT)
Dept: OUTPATIENT SERVICES | Facility: HOSPITAL | Age: 70
LOS: 1 days | End: 2025-07-01

## 2025-07-01 DIAGNOSIS — Z98.890 OTHER SPECIFIED POSTPROCEDURAL STATES: Chronic | ICD-10-CM

## 2025-07-01 DIAGNOSIS — Z90.49 ACQUIRED ABSENCE OF OTHER SPECIFIED PARTS OF DIGESTIVE TRACT: Chronic | ICD-10-CM

## 2025-07-01 DIAGNOSIS — I82.409 ACUTE EMBOLISM AND THROMBOSIS OF UNSPECIFIED DEEP VEINS OF UNSPECIFIED LOWER EXTREMITY: ICD-10-CM

## 2025-07-01 PROCEDURE — 93970 EXTREMITY STUDY: CPT | Mod: 26

## 2025-07-11 NOTE — IMAGING
DISPLAY PLAN FREE TEXT DISPLAY PLAN FREE TEXT [de-identified] : Right Hip/Thigh: Inspection of the hip/thigh is as follows: Inspection shows no swelling. Range of motion of the hip\par is as follows: limited internal rotation and limited external rotation. \par \par Left Hip/Thigh: Inspection of the hip/thigh is as follows: Inspection shows no swelling. Range of motion of the hip is\par as follows: limited internal rotation and limited external rotation. \par \par Right Knee: Inspection of the knee is as follows: no effusion, erythema, ecchymosis, scars or deformities. Palpation\par of the knee is as follows: medial joint line tenderness, lateral joint line tenderness, medial facet of patella\par tenderness, lateral facet of patella tenderness, patellar compression tenderness and crepitus about the\par patella. Knee Range of Motion is as follows: full flexion and extension without pain (0-140). Strength examination of the\par knee is as follows: Quadriceps strength is 5/5 Hamstring strength is 5/5 Ligament Stability and Special\par Test ligamentously stable. Neurological examination of the knee is as follows: light touch is intact throughout. \par \par Left Knee: Inspection of the knee is as follows: no effusion, erythema, ecchymosis, scars or deformities. Palpation of\par the knee is as follows: medial joint line tenderness, lateral joint line tenderness, medial facet of patella\par tenderness, lateral facet of patella tenderness, patellar compression tenderness and crepitus about the\par patella. Knee Range of Motion is as follows: full flexion and extension without pain (0-140). Strength examination of the\par knee is as follows: Quadriceps strength is 5/5 Hamstring strength is 5/5 Ligament Stability and Special\par Test ligamentously stable. Neurological examination of the knee is as follows: light touch is intact throughout. DISPLAY PLAN FREE TEXT DISPLAY PLAN FREE TEXT

## 2025-08-26 ENCOUNTER — APPOINTMENT (OUTPATIENT)
Dept: ORTHOPEDIC SURGERY | Facility: CLINIC | Age: 70
End: 2025-08-26

## 2025-08-26 PROCEDURE — 20610 DRAIN/INJ JOINT/BURSA W/O US: CPT | Mod: RT

## 2025-09-05 ENCOUNTER — APPOINTMENT (OUTPATIENT)
Dept: ORTHOPEDIC SURGERY | Facility: CLINIC | Age: 70
End: 2025-09-05

## 2025-09-05 ENCOUNTER — RESULT CHARGE (OUTPATIENT)
Age: 70
End: 2025-09-05

## 2025-09-05 DIAGNOSIS — M17.11 UNILATERAL PRIMARY OSTEOARTHRITIS, RIGHT KNEE: ICD-10-CM

## 2025-09-06 ENCOUNTER — NON-APPOINTMENT (OUTPATIENT)
Age: 70
End: 2025-09-06

## 2025-09-08 ENCOUNTER — APPOINTMENT (OUTPATIENT)
Dept: HEMATOLOGY ONCOLOGY | Facility: CLINIC | Age: 70
End: 2025-09-08
Payer: COMMERCIAL

## 2025-09-08 VITALS
WEIGHT: 210.98 LBS | TEMPERATURE: 97.9 F | DIASTOLIC BLOOD PRESSURE: 75 MMHG | OXYGEN SATURATION: 95 % | HEIGHT: 65 IN | BODY MASS INDEX: 35.15 KG/M2 | SYSTOLIC BLOOD PRESSURE: 148 MMHG | HEART RATE: 63 BPM

## 2025-09-08 DIAGNOSIS — I82.409 ACUTE EMBOLISM AND THROMBOSIS OF UNSPECIFIED DEEP VEINS OF UNSPECIFIED LOWER EXTREMITY: ICD-10-CM

## 2025-09-08 PROCEDURE — 99215 OFFICE O/P EST HI 40 MIN: CPT

## 2025-09-12 ENCOUNTER — APPOINTMENT (OUTPATIENT)
Dept: ORTHOPEDIC SURGERY | Facility: CLINIC | Age: 70
End: 2025-09-12

## 2025-09-19 ENCOUNTER — APPOINTMENT (OUTPATIENT)
Dept: ORTHOPEDIC SURGERY | Facility: CLINIC | Age: 70
End: 2025-09-19

## 2025-09-19 DIAGNOSIS — M17.11 UNILATERAL PRIMARY OSTEOARTHRITIS, RIGHT KNEE: ICD-10-CM
